# Patient Record
Sex: FEMALE | Race: OTHER | URBAN - METROPOLITAN AREA
[De-identification: names, ages, dates, MRNs, and addresses within clinical notes are randomized per-mention and may not be internally consistent; named-entity substitution may affect disease eponyms.]

---

## 2022-11-18 ENCOUNTER — OFFICE VISIT (OUTPATIENT)
Dept: OBGYN CLINIC | Facility: CLINIC | Age: 32
End: 2022-11-18
Payer: MEDICAID

## 2022-11-18 VITALS
SYSTOLIC BLOOD PRESSURE: 123 MMHG | DIASTOLIC BLOOD PRESSURE: 74 MMHG | HEART RATE: 73 BPM | WEIGHT: 199.81 LBS | HEIGHT: 67 IN | BODY MASS INDEX: 31.36 KG/M2

## 2022-11-18 DIAGNOSIS — N92.6 MISSED MENSES: Primary | ICD-10-CM

## 2022-11-18 PROBLEM — Z98.891 PREVIOUS CESAREAN SECTION: Status: ACTIVE | Noted: 2022-11-18

## 2022-11-18 PROBLEM — Z78.9 PREFERRED LANGUAGE IS URDU: Status: ACTIVE | Noted: 2022-11-18

## 2022-11-18 PROBLEM — O09.30 INSUFFICIENT PRENATAL CARE (HCC): Status: ACTIVE | Noted: 2022-11-18

## 2022-11-18 PROBLEM — O99.210 OBESITY IN PREGNANCY: Status: ACTIVE | Noted: 2022-11-18

## 2022-11-18 PROBLEM — O09.30 INSUFFICIENT PRENATAL CARE: Status: ACTIVE | Noted: 2022-11-18

## 2022-11-18 PROBLEM — O99.210 OBESITY IN PREGNANCY (HCC): Status: ACTIVE | Noted: 2022-11-18

## 2022-11-18 PROBLEM — IMO0001 PREFERRED LANGUAGE IS URDU: Status: ACTIVE | Noted: 2022-11-18

## 2022-11-18 LAB
CONTROL LINE PRESENT WITH A CLEAR BACKGROUND (YES/NO): YES YES/NO
PREGNANCY TEST, URINE: POSITIVE

## 2022-11-18 PROCEDURE — 3008F BODY MASS INDEX DOCD: CPT | Performed by: ADVANCED PRACTICE MIDWIFE

## 2022-11-18 PROCEDURE — 3078F DIAST BP <80 MM HG: CPT | Performed by: ADVANCED PRACTICE MIDWIFE

## 2022-11-18 PROCEDURE — 99203 OFFICE O/P NEW LOW 30 MIN: CPT | Performed by: ADVANCED PRACTICE MIDWIFE

## 2022-11-18 PROCEDURE — 3074F SYST BP LT 130 MM HG: CPT | Performed by: ADVANCED PRACTICE MIDWIFE

## 2022-11-18 PROCEDURE — 81025 URINE PREGNANCY TEST: CPT | Performed by: ADVANCED PRACTICE MIDWIFE

## 2022-11-18 RX ORDER — PNV NO.95/FERROUS FUM/FOLIC AC 28MG-0.8MG
TABLET ORAL
COMMUNITY
Start: 2020-05-22

## 2022-11-18 RX ORDER — FAMOTIDINE 20 MG/1
20 TABLET, FILM COATED ORAL 2 TIMES DAILY
Qty: 60 TABLET | Refills: 2 | Status: SHIPPED | OUTPATIENT
Start: 2022-11-18

## 2022-11-18 RX ORDER — FERROUS SULFATE 325(65) MG
TABLET ORAL
COMMUNITY

## 2022-11-23 ENCOUNTER — NURSE ONLY (OUTPATIENT)
Dept: OBGYN CLINIC | Facility: CLINIC | Age: 32
End: 2022-11-23
Payer: MEDICAID

## 2022-11-23 DIAGNOSIS — Z34.81 ENCOUNTER FOR SUPERVISION OF OTHER NORMAL PREGNANCY IN FIRST TRIMESTER: Primary | ICD-10-CM

## 2022-11-23 PROCEDURE — 99211 OFF/OP EST MAY X REQ PHY/QHP: CPT | Performed by: ADVANCED PRACTICE MIDWIFE

## 2022-11-23 NOTE — PROGRESS NOTES
Telephone call today for OB RN education visit, educational material reviewed.  line used [id number]. Pt verbalized understanding. Prepregnancy BMI 31. Orders placed for NOB labs including HCV, hemoglobin A1C, varicella and nutrition consult. Patients desires cell free DNA testing. Advised kit to be picked up from office. Pt had a Pap 2020. Pt was scheduled for NOB appt. Patient advised on OTC medications to treat constipation. Patient will complete EDPS and MICHELINE at next appointment. Pt desires natural birth with epidural.     Pt. Has answered NO 5P questions and has NO  risk factors. Pt. Given What Pregnant need to Know handout     Pt. Counseled on AGOG and ACNM guidelines recommending carrier testing. . Patient declined.

## 2022-12-16 ENCOUNTER — INITIAL PRENATAL (OUTPATIENT)
Dept: OBGYN CLINIC | Facility: CLINIC | Age: 32
End: 2022-12-16
Payer: MEDICAID

## 2022-12-16 ENCOUNTER — TELEPHONE (OUTPATIENT)
Dept: PERINATAL CARE | Facility: HOSPITAL | Age: 32
End: 2022-12-16

## 2022-12-16 ENCOUNTER — LAB ENCOUNTER (OUTPATIENT)
Dept: LAB | Facility: HOSPITAL | Age: 32
End: 2022-12-16
Attending: ADVANCED PRACTICE MIDWIFE
Payer: MEDICAID

## 2022-12-16 VITALS
DIASTOLIC BLOOD PRESSURE: 74 MMHG | WEIGHT: 205 LBS | BODY MASS INDEX: 32 KG/M2 | SYSTOLIC BLOOD PRESSURE: 111 MMHG | HEART RATE: 86 BPM

## 2022-12-16 DIAGNOSIS — O99.210 OBESITY IN PREGNANCY: ICD-10-CM

## 2022-12-16 DIAGNOSIS — Z34.81 ENCOUNTER FOR SUPERVISION OF OTHER NORMAL PREGNANCY IN FIRST TRIMESTER: ICD-10-CM

## 2022-12-16 DIAGNOSIS — Z34.82 ENCOUNTER FOR SUPERVISION OF OTHER NORMAL PREGNANCY IN SECOND TRIMESTER: Primary | ICD-10-CM

## 2022-12-16 DIAGNOSIS — Z23 NEED FOR VACCINATION: ICD-10-CM

## 2022-12-16 PROBLEM — Z34.90 PREGNANCY: Status: ACTIVE | Noted: 2022-11-18

## 2022-12-16 PROBLEM — Z34.90 PREGNANCY (HCC): Status: ACTIVE | Noted: 2022-11-18

## 2022-12-16 LAB
ANTIBODY SCREEN: NEGATIVE
BASOPHILS # BLD AUTO: 0.02 X10(3) UL (ref 0–0.2)
BASOPHILS NFR BLD AUTO: 0.2 %
DEPRECATED RDW RBC AUTO: 43.5 FL (ref 35.1–46.3)
EOSINOPHIL # BLD AUTO: 0.13 X10(3) UL (ref 0–0.7)
EOSINOPHIL NFR BLD AUTO: 1.2 %
ERYTHROCYTE [DISTWIDTH] IN BLOOD BY AUTOMATED COUNT: 15.2 % (ref 11–15)
EST. AVERAGE GLUCOSE BLD GHB EST-MCNC: 108 MG/DL (ref 68–126)
HBA1C MFR BLD: 5.4 % (ref ?–5.7)
HBV SURFACE AG SER-ACNC: <0.1 [IU]/L
HBV SURFACE AG SERPL QL IA: NONREACTIVE
HCT VFR BLD AUTO: 39.6 %
HCV AB SERPL QL IA: NONREACTIVE
HGB BLD-MCNC: 12.7 G/DL
IMM GRANULOCYTES # BLD AUTO: 0.07 X10(3) UL (ref 0–1)
IMM GRANULOCYTES NFR BLD: 0.6 %
LYMPHOCYTES # BLD AUTO: 1.68 X10(3) UL (ref 1–4)
LYMPHOCYTES NFR BLD AUTO: 15.1 %
MCH RBC QN AUTO: 25.4 PG (ref 26–34)
MCHC RBC AUTO-ENTMCNC: 32.1 G/DL (ref 31–37)
MCV RBC AUTO: 79.2 FL
MONOCYTES # BLD AUTO: 0.52 X10(3) UL (ref 0.1–1)
MONOCYTES NFR BLD AUTO: 4.7 %
NEUTROPHILS # BLD AUTO: 8.68 X10 (3) UL (ref 1.5–7.7)
NEUTROPHILS # BLD AUTO: 8.68 X10(3) UL (ref 1.5–7.7)
NEUTROPHILS NFR BLD AUTO: 78.2 %
PLATELET # BLD AUTO: 288 10(3)UL (ref 150–450)
RBC # BLD AUTO: 5 X10(6)UL
RH BLOOD TYPE: POSITIVE
RUBV IGG SER QL: POSITIVE
RUBV IGG SER-ACNC: >500 IU/ML (ref 10–?)
WBC # BLD AUTO: 11.1 X10(3) UL (ref 4–11)

## 2022-12-16 PROCEDURE — 86803 HEPATITIS C AB TEST: CPT

## 2022-12-16 PROCEDURE — 3074F SYST BP LT 130 MM HG: CPT | Performed by: ADVANCED PRACTICE MIDWIFE

## 2022-12-16 PROCEDURE — 86900 BLOOD TYPING SEROLOGIC ABO: CPT

## 2022-12-16 PROCEDURE — 87389 HIV-1 AG W/HIV-1&-2 AB AG IA: CPT

## 2022-12-16 PROCEDURE — 86780 TREPONEMA PALLIDUM: CPT

## 2022-12-16 PROCEDURE — 87086 URINE CULTURE/COLONY COUNT: CPT

## 2022-12-16 PROCEDURE — 3078F DIAST BP <80 MM HG: CPT | Performed by: ADVANCED PRACTICE MIDWIFE

## 2022-12-16 PROCEDURE — 86762 RUBELLA ANTIBODY: CPT

## 2022-12-16 PROCEDURE — 90471 IMMUNIZATION ADMIN: CPT | Performed by: ADVANCED PRACTICE MIDWIFE

## 2022-12-16 PROCEDURE — 86850 RBC ANTIBODY SCREEN: CPT

## 2022-12-16 PROCEDURE — 85025 COMPLETE CBC W/AUTO DIFF WBC: CPT

## 2022-12-16 PROCEDURE — 87340 HEPATITIS B SURFACE AG IA: CPT

## 2022-12-16 PROCEDURE — 0502F SUBSEQUENT PRENATAL CARE: CPT | Performed by: ADVANCED PRACTICE MIDWIFE

## 2022-12-16 PROCEDURE — 86901 BLOOD TYPING SEROLOGIC RH(D): CPT

## 2022-12-16 PROCEDURE — 36415 COLL VENOUS BLD VENIPUNCTURE: CPT

## 2022-12-16 PROCEDURE — 83036 HEMOGLOBIN GLYCOSYLATED A1C: CPT

## 2022-12-16 PROCEDURE — 90686 IIV4 VACC NO PRSV 0.5 ML IM: CPT | Performed by: ADVANCED PRACTICE MIDWIFE

## 2022-12-16 PROCEDURE — 86787 VARICELLA-ZOSTER ANTIBODY: CPT

## 2022-12-16 NOTE — TELEPHONE ENCOUNTER
Primary Diagnosis Code: O99.210 Description: Obesity complicating pregnancy, unspecified trimester  Secondary Diagnosis Code:  Description:   Date of Service: Not provided    CPT Code: 47313 Description: Capri Pate FETUS  Authorization Number: [id number]  Review Date: 12/16/2022 4:05:10 PM  Expiration Date: 9/12/2023  Status: Your case has been Approved.

## 2022-12-16 NOTE — PROGRESS NOTES
Some nausea, no vomiting. No pain or bleeding. Declines genetic screening. Will go do labs today. Here with . Hx of c/s for FTD, got to complete and pushed. Baby 10+ #. Discussed diet and decreasing carbs and sugars to try to help with decreasing risk of macrosomia. Desires . Will plan growth ultrasound near term for EFW.  Warning signs reviewed

## 2022-12-19 LAB
T PALLIDUM AB SER QL: NEGATIVE
VZV IGG SER IA-ACNC: 1270 (ref 165–?)

## 2023-01-13 ENCOUNTER — ROUTINE PRENATAL (OUTPATIENT)
Dept: OBGYN CLINIC | Facility: CLINIC | Age: 33
End: 2023-01-13

## 2023-01-13 DIAGNOSIS — Z34.83 PRENATAL CARE, SUBSEQUENT PREGNANCY IN THIRD TRIMESTER: Primary | ICD-10-CM

## 2023-01-13 PROCEDURE — 0502F SUBSEQUENT PRENATAL CARE: CPT | Performed by: ADVANCED PRACTICE MIDWIFE

## 2023-01-20 ENCOUNTER — ROUTINE PRENATAL (OUTPATIENT)
Dept: OBGYN CLINIC | Facility: CLINIC | Age: 33
End: 2023-01-20

## 2023-01-20 VITALS
BODY MASS INDEX: 34 KG/M2 | DIASTOLIC BLOOD PRESSURE: 73 MMHG | SYSTOLIC BLOOD PRESSURE: 105 MMHG | WEIGHT: 215 LBS | HEART RATE: 87 BPM

## 2023-01-20 DIAGNOSIS — N76.0 VAGINITIS AND VULVOVAGINITIS: ICD-10-CM

## 2023-01-20 DIAGNOSIS — Z34.82 PRENATAL CARE, SUBSEQUENT PREGNANCY IN SECOND TRIMESTER: Primary | ICD-10-CM

## 2023-01-20 PROCEDURE — 3074F SYST BP LT 130 MM HG: CPT | Performed by: ADVANCED PRACTICE MIDWIFE

## 2023-01-20 PROCEDURE — 0502F SUBSEQUENT PRENATAL CARE: CPT | Performed by: ADVANCED PRACTICE MIDWIFE

## 2023-01-20 PROCEDURE — 3078F DIAST BP <80 MM HG: CPT | Performed by: ADVANCED PRACTICE MIDWIFE

## 2023-01-20 NOTE — PROGRESS NOTES
Radha Head, is at 18w4d, here for her JAZZMINE visit. She declines an Romansh  today and elects her  to translate for her. Currently, she is feeling well. Denies 2nd trimester danger signs. She complains of vaginal discharge with odor, starting 1-2 weeks ago. Denies itching, lesions, or pelvic pain. Vital signs and weight reviewed  See flowsheets Prenatal Physical  Flu shot & COVID shot status: completed, completed     Patient Active Problem List:     Previous  section     Obesity in pregnancy     Preferred language is Romansh     Pregnancy     Physical Exam  Cardiovascular:      Rate and Rhythm: Normal rate and regular rhythm. Pulmonary:      Effort: Pulmonary effort is normal.   Genitourinary:     General: Normal vulva. Exam position: Lithotomy position. Vagina: Vaginal discharge present. Cervix: Normal.      Comments: Curdy white discharge present in the vault. Musculoskeletal:         General: Normal range of motion. Cervical back: Normal range of motion. Skin:     General: Skin is warm. Neurological:      General: No focal deficit present. Mental Status: She is alert. Psychiatric:         Mood and Affect: Mood normal.       Assessment/Plan: Vaginitis swab collected and will treat based on results. Pt to complete anatomy US. She already has an appt. Will monitor for receipt of C/S OP report  Next visit: 4 weeks     Reviewed:   Prenatal visit schedule  COVID-19 precautions and CDC guidelines for pregnant women in pregnancy. See AVS  CNM care  2nd trimester precautions and expectations   labor precautions  Danger signs  Labor precautions    Pt verbalized understanding. All questions answered. No barriers to learning identified    WINDY Barreto/QUEENIE under direct supervision of Jed Rogers CNM    Patient seen in conjunction with WINDY. I was present for exam and evaluation and agree with assessment and plan.

## 2023-01-22 LAB
GENITAL VAGINOSIS SCREEN: POSITIVE
TRICHOMONAS SCREEN: NEGATIVE

## 2023-02-02 ENCOUNTER — TELEPHONE (OUTPATIENT)
Dept: OBGYN CLINIC | Facility: CLINIC | Age: 33
End: 2023-02-02

## 2023-02-02 RX ORDER — METRONIDAZOLE 500 MG/1
500 TABLET ORAL 2 TIMES DAILY
Qty: 14 TABLET | Refills: 0 | Status: SHIPPED | OUTPATIENT
Start: 2023-02-02 | End: 2023-02-09

## 2023-02-02 NOTE — TELEPHONE ENCOUNTER
----- Message from Terrence Germain CNM sent at 2/2/2023  2:35 PM CST -----  Please notify patient by phone with [employer]  of BV and yeast infections. Send scripts for Flagyl 500mg PO BID x7 days and Clotrimazole 2% vaginal cream x3 days.

## 2023-02-03 ENCOUNTER — HOSPITAL ENCOUNTER (OUTPATIENT)
Dept: PERINATAL CARE | Facility: HOSPITAL | Age: 33
Discharge: HOME | End: 2023-02-03
Attending: OBSTETRICS & GYNECOLOGY
Payer: MEDICAID

## 2023-02-03 ENCOUNTER — HOSPITAL ENCOUNTER (OUTPATIENT)
Dept: PERINATAL CARE | Facility: HOSPITAL | Age: 33
Discharge: HOME | End: 2023-02-03
Attending: ADVANCED PRACTICE MIDWIFE
Payer: MEDICAID

## 2023-02-03 VITALS
BODY MASS INDEX: 34 KG/M2 | DIASTOLIC BLOOD PRESSURE: 71 MMHG | HEART RATE: 97 BPM | SYSTOLIC BLOOD PRESSURE: 120 MMHG | WEIGHT: 215 LBS

## 2023-02-03 DIAGNOSIS — O99.210 OBESITY IN PREGNANCY: ICD-10-CM

## 2023-02-03 DIAGNOSIS — O99.212 OBESITY AFFECTING PREGNANCY IN SECOND TRIMESTER: Primary | ICD-10-CM

## 2023-02-03 DIAGNOSIS — O99.212 OBESITY AFFECTING PREGNANCY IN SECOND TRIMESTER: ICD-10-CM

## 2023-02-03 DIAGNOSIS — E66.9 CLASS 1 OBESITY WITHOUT SERIOUS COMORBIDITY WITH BODY MASS INDEX (BMI) OF 31.0 TO 31.9 IN ADULT, UNSPECIFIED OBESITY TYPE: ICD-10-CM

## 2023-02-03 PROCEDURE — 76811 OB US DETAILED SNGL FETUS: CPT | Performed by: OBSTETRICS & GYNECOLOGY

## 2023-02-06 NOTE — TELEPHONE ENCOUNTER
Talked to pt's , pt was on the phone call too, and informed both parties of test results and medication. Per , pt has already picked up her medication and has begun using. Pt and  have no questions.

## 2023-02-20 ENCOUNTER — ROUTINE PRENATAL (OUTPATIENT)
Dept: OBGYN CLINIC | Facility: CLINIC | Age: 33
End: 2023-02-20

## 2023-02-20 VITALS
WEIGHT: 216 LBS | HEART RATE: 86 BPM | SYSTOLIC BLOOD PRESSURE: 115 MMHG | BODY MASS INDEX: 34 KG/M2 | DIASTOLIC BLOOD PRESSURE: 76 MMHG

## 2023-02-20 DIAGNOSIS — N89.8 VAGINAL DISCHARGE: ICD-10-CM

## 2023-02-20 DIAGNOSIS — Z34.82 ENCOUNTER FOR SUPERVISION OF OTHER NORMAL PREGNANCY IN SECOND TRIMESTER: Primary | ICD-10-CM

## 2023-02-20 PROCEDURE — 0502F SUBSEQUENT PRENATAL CARE: CPT | Performed by: ADVANCED PRACTICE MIDWIFE

## 2023-02-20 PROCEDURE — 3074F SYST BP LT 130 MM HG: CPT | Performed by: ADVANCED PRACTICE MIDWIFE

## 2023-02-20 PROCEDURE — 3078F DIAST BP <80 MM HG: CPT | Performed by: ADVANCED PRACTICE MIDWIFE

## 2023-02-21 NOTE — PROGRESS NOTES
Marivel Jung reports she is feeling well. Endorses fetal movement. Denies contraction or vaginal bleeding. Does state she has some leaking. Unsure if discharge. She was treated for yeast and BV on 1/20. Speculum exam: Thick/chunky discharge, nitrazine negative, no pooling. Vaginosis swab collected and will treat based on results. Reviewed warning signs and when to call.  JAZZMINE 4wks

## 2023-02-22 ENCOUNTER — TELEPHONE (OUTPATIENT)
Dept: OBGYN CLINIC | Facility: CLINIC | Age: 33
End: 2023-02-22

## 2023-02-22 LAB
GENITAL VAGINOSIS SCREEN: NEGATIVE
TRICHOMONAS SCREEN: NEGATIVE

## 2023-02-22 NOTE — TELEPHONE ENCOUNTER
Notified pt of results & instructions per Jose Francisco Hope. Pharmacy confirmed.  got on phone & asked if the meat from [employer] that they had delivered, is halal. Advised  I am unfamiliar with that program & if unsure, give to someone who it isnt an issue for.   verbalized an understanding & agrees w/ plan

## 2023-02-22 NOTE — TELEPHONE ENCOUNTER
----- Message from Marcellus Mathews CNM sent at 2/22/2023  3:32 PM CST -----  Please call patient and let her know she has a yeast infection again. I have sent terconazole to her pharmacy. Thanks!

## 2023-02-23 ENCOUNTER — TELEPHONE (OUTPATIENT)
Dept: OBGYN CLINIC | Facility: CLINIC | Age: 33
End: 2023-02-23

## 2023-03-24 ENCOUNTER — ROUTINE PRENATAL (OUTPATIENT)
Dept: OBGYN CLINIC | Facility: CLINIC | Age: 33
End: 2023-03-24

## 2023-03-24 VITALS
HEART RATE: 81 BPM | WEIGHT: 224 LBS | BODY MASS INDEX: 35 KG/M2 | SYSTOLIC BLOOD PRESSURE: 107 MMHG | DIASTOLIC BLOOD PRESSURE: 74 MMHG

## 2023-03-24 DIAGNOSIS — M54.9 BACK PAIN IN PREGNANCY: ICD-10-CM

## 2023-03-24 DIAGNOSIS — Z34.82 ENCOUNTER FOR SUPERVISION OF OTHER NORMAL PREGNANCY IN SECOND TRIMESTER: Primary | ICD-10-CM

## 2023-03-24 DIAGNOSIS — O09.299 HISTORY OF MACROSOMIA IN INFANT IN PRIOR PREGNANCY, CURRENTLY PREGNANT: ICD-10-CM

## 2023-03-24 DIAGNOSIS — O99.891 BACK PAIN IN PREGNANCY: ICD-10-CM

## 2023-03-24 DIAGNOSIS — O99.210 OBESITY IN PREGNANCY, ANTEPARTUM: ICD-10-CM

## 2023-03-24 PROCEDURE — 3078F DIAST BP <80 MM HG: CPT | Performed by: ADVANCED PRACTICE MIDWIFE

## 2023-03-24 PROCEDURE — 3074F SYST BP LT 130 MM HG: CPT | Performed by: ADVANCED PRACTICE MIDWIFE

## 2023-03-24 PROCEDURE — 0502F SUBSEQUENT PRENATAL CARE: CPT | Performed by: ADVANCED PRACTICE MIDWIFE

## 2023-03-24 NOTE — PROGRESS NOTES
here with pt and helping with interpretation though she understands some English as well. Declines use of . Reports Back pain- PT referral placed  Still have not received Op report. Reviewed care everywhere and do not see it.  reports they received a letter that it was requested. JEET for op report signed again today. Given contact info for MD's for consult visit. To schedule several weeks out as want Op report prior to consult. Reports got message that growth ultrasound denied. Will place referral again. Looks like it may have been for detailed anatomy instead of growth so may be why denied. Baby active. Denies contractions, LOF or bleeding. Kick counts and signs of PTL reviewed. To do 3rd tri labs.

## 2023-03-26 PROBLEM — O09.299 HISTORY OF MACROSOMIA IN INFANT IN PRIOR PREGNANCY, CURRENTLY PREGNANT (HCC): Status: ACTIVE | Noted: 2023-03-26

## 2023-03-26 PROBLEM — O09.299 HISTORY OF MACROSOMIA IN INFANT IN PRIOR PREGNANCY, CURRENTLY PREGNANT: Status: ACTIVE | Noted: 2023-03-26

## 2023-03-29 ENCOUNTER — TELEPHONE (OUTPATIENT)
Dept: OBGYN CLINIC | Facility: CLINIC | Age: 33
End: 2023-03-29

## 2023-03-30 ENCOUNTER — TELEPHONE (OUTPATIENT)
Dept: PERINATAL CARE | Facility: HOSPITAL | Age: 33
End: 2023-03-30

## 2023-04-04 ENCOUNTER — TELEPHONE (OUTPATIENT)
Dept: OBGYN CLINIC | Facility: CLINIC | Age: 33
End: 2023-04-04

## 2023-04-04 NOTE — TELEPHONE ENCOUNTER
Records received.  [facility]

## 2023-04-07 ENCOUNTER — LAB ENCOUNTER (OUTPATIENT)
Dept: LAB | Facility: HOSPITAL | Age: 33
End: 2023-04-07
Attending: ADVANCED PRACTICE MIDWIFE
Payer: MEDICAID

## 2023-04-07 ENCOUNTER — ROUTINE PRENATAL (OUTPATIENT)
Dept: OBGYN CLINIC | Facility: CLINIC | Age: 33
End: 2023-04-07

## 2023-04-07 VITALS
BODY MASS INDEX: 35 KG/M2 | WEIGHT: 224 LBS | SYSTOLIC BLOOD PRESSURE: 102 MMHG | HEART RATE: 92 BPM | DIASTOLIC BLOOD PRESSURE: 69 MMHG

## 2023-04-07 DIAGNOSIS — Z11.3 SCREENING FOR STDS (SEXUALLY TRANSMITTED DISEASES): ICD-10-CM

## 2023-04-07 DIAGNOSIS — Z34.83 PRENATAL CARE, SUBSEQUENT PREGNANCY IN THIRD TRIMESTER: Primary | ICD-10-CM

## 2023-04-07 DIAGNOSIS — N76.0 VAGINITIS AND VULVOVAGINITIS: ICD-10-CM

## 2023-04-07 DIAGNOSIS — Z34.82 ENCOUNTER FOR SUPERVISION OF OTHER NORMAL PREGNANCY IN SECOND TRIMESTER: ICD-10-CM

## 2023-04-07 LAB
DEPRECATED RDW RBC AUTO: 38.5 FL (ref 35.1–46.3)
ERYTHROCYTE [DISTWIDTH] IN BLOOD BY AUTOMATED COUNT: 13.5 % (ref 11–15)
GLUCOSE 1H P GLC SERPL-MCNC: 144 MG/DL
HCT VFR BLD AUTO: 39.1 %
HGB BLD-MCNC: 12.7 G/DL
MCH RBC QN AUTO: 25.9 PG (ref 26–34)
MCHC RBC AUTO-ENTMCNC: 32.5 G/DL (ref 31–37)
MCV RBC AUTO: 79.8 FL
PLATELET # BLD AUTO: 256 10(3)UL (ref 150–450)
RBC # BLD AUTO: 4.9 X10(6)UL
WBC # BLD AUTO: 11.1 X10(3) UL (ref 4–11)

## 2023-04-07 PROCEDURE — 90471 IMMUNIZATION ADMIN: CPT | Performed by: ADVANCED PRACTICE MIDWIFE

## 2023-04-07 PROCEDURE — 3078F DIAST BP <80 MM HG: CPT | Performed by: ADVANCED PRACTICE MIDWIFE

## 2023-04-07 PROCEDURE — 36415 COLL VENOUS BLD VENIPUNCTURE: CPT

## 2023-04-07 PROCEDURE — 82950 GLUCOSE TEST: CPT

## 2023-04-07 PROCEDURE — 86780 TREPONEMA PALLIDUM: CPT

## 2023-04-07 PROCEDURE — 87389 HIV-1 AG W/HIV-1&-2 AB AG IA: CPT

## 2023-04-07 PROCEDURE — 3074F SYST BP LT 130 MM HG: CPT | Performed by: ADVANCED PRACTICE MIDWIFE

## 2023-04-07 PROCEDURE — 0502F SUBSEQUENT PRENATAL CARE: CPT | Performed by: ADVANCED PRACTICE MIDWIFE

## 2023-04-07 PROCEDURE — 85027 COMPLETE CBC AUTOMATED: CPT

## 2023-04-07 PROCEDURE — 90715 TDAP VACCINE 7 YRS/> IM: CPT | Performed by: ADVANCED PRACTICE MIDWIFE

## 2023-04-07 NOTE — PROGRESS NOTES
Gideon Allan, is at 29w4d, here for her JAZZMINE visit. Currently, she is feeling well. Denies 3rd trimester danger signs. Will complete 3T labs today following this visit. Accepts Tdap. Notes greenish vaginal discharge x5-6 days, no itching, just odor    Vital signs and weight reviewed  See flowsheets   Thick yellow clumpy discharge in vagina    Assessment/Plan: Tdap given, 3T labs to follow this visit  H/O C/S: pt to schedule MD consult, contact info provided  Vaginitis: vaginal culture and GC/CT sent. eRx clotrimazole sent  Next visit: 2 weeks    Reviewed:   Prenatal visit schedule  Recommendations and rationale for Tdap vaccine(s) in pregnancy   labor precautions  Kick counts  Danger signs    Pt verbalized understanding. All questions answered.  No barriers to learning identified

## 2023-04-09 LAB
GENITAL VAGINOSIS SCREEN: NEGATIVE
TRICHOMONAS SCREEN: NEGATIVE

## 2023-04-10 LAB
C TRACH DNA SPEC QL NAA+PROBE: NEGATIVE
N GONORRHOEA DNA SPEC QL NAA+PROBE: NEGATIVE
T PALLIDUM AB SER QL: NEGATIVE

## 2023-04-11 ENCOUNTER — TELEPHONE (OUTPATIENT)
Dept: OBGYN CLINIC | Facility: CLINIC | Age: 33
End: 2023-04-11

## 2023-04-11 DIAGNOSIS — O99.810 ABNORMAL MATERNAL GLUCOSE TOLERANCE, ANTEPARTUM: Primary | ICD-10-CM

## 2023-04-11 NOTE — TELEPHONE ENCOUNTER
----- Message from Jena Mast CNM sent at 4/10/2023 11:23 AM CDT -----  Please call patient. 1hr GTT was elevated. She will need a 3 hour. Remaining labs normal. Thanks!

## 2023-04-13 NOTE — TELEPHONE ENCOUNTER
Notified pt &  of results & instructions per Neil Ivory. Testing instructions for 3hr gtt & phone # to schedule given. Pt declined . Phone call transferred to CS.  Pt verbalized an understanding & agrees w/ plan

## 2023-04-24 ENCOUNTER — ROUTINE PRENATAL (OUTPATIENT)
Dept: OBGYN CLINIC | Facility: CLINIC | Age: 33
End: 2023-04-24

## 2023-04-24 VITALS
DIASTOLIC BLOOD PRESSURE: 76 MMHG | WEIGHT: 228 LBS | SYSTOLIC BLOOD PRESSURE: 110 MMHG | HEART RATE: 89 BPM | BODY MASS INDEX: 36 KG/M2

## 2023-04-24 DIAGNOSIS — N76.0 VAGINITIS AND VULVOVAGINITIS: ICD-10-CM

## 2023-04-24 DIAGNOSIS — O99.810 ABNORMAL MATERNAL GLUCOSE TOLERANCE, ANTEPARTUM: Primary | ICD-10-CM

## 2023-04-24 LAB — TRICHOMONAS SCREEN: NEGATIVE

## 2023-04-24 PROCEDURE — 87808 TRICHOMONAS ASSAY W/OPTIC: CPT | Performed by: ADVANCED PRACTICE MIDWIFE

## 2023-04-24 PROCEDURE — 87106 FUNGI IDENTIFICATION YEAST: CPT | Performed by: ADVANCED PRACTICE MIDWIFE

## 2023-04-24 PROCEDURE — 87205 SMEAR GRAM STAIN: CPT | Performed by: ADVANCED PRACTICE MIDWIFE

## 2023-04-24 NOTE — PROGRESS NOTES
Active baby. Has 3 hour scheduled. Encouraged ADA diet. Reviewed danger signs. Has growth scheduled. Follow-up in 2 weeks.

## 2023-04-26 LAB
GENITAL VAGINOSIS SCREEN: NEGATIVE
TRICHOMONAS SCREEN: NEGATIVE

## 2023-04-28 ENCOUNTER — HOSPITAL ENCOUNTER (OUTPATIENT)
Dept: PERINATAL CARE | Facility: HOSPITAL | Age: 33
Discharge: HOME | End: 2023-04-28
Attending: OBSTETRICS & GYNECOLOGY
Payer: MEDICAID

## 2023-04-28 VITALS
BODY MASS INDEX: 36 KG/M2 | WEIGHT: 228 LBS | DIASTOLIC BLOOD PRESSURE: 72 MMHG | SYSTOLIC BLOOD PRESSURE: 108 MMHG | HEART RATE: 85 BPM

## 2023-04-28 DIAGNOSIS — O09.299 HISTORY OF MACROSOMIA IN INFANT IN PRIOR PREGNANCY, CURRENTLY PREGNANT: ICD-10-CM

## 2023-04-28 DIAGNOSIS — Z98.891 PREVIOUS CESAREAN SECTION: ICD-10-CM

## 2023-04-28 DIAGNOSIS — O99.213 OBESITY AFFECTING PREGNANCY IN THIRD TRIMESTER: ICD-10-CM

## 2023-04-28 DIAGNOSIS — O99.213 OBESITY AFFECTING PREGNANCY IN THIRD TRIMESTER: Primary | ICD-10-CM

## 2023-04-28 PROCEDURE — 76816 OB US FOLLOW-UP PER FETUS: CPT | Performed by: OBSTETRICS & GYNECOLOGY

## 2023-04-29 ENCOUNTER — LABORATORY ENCOUNTER (OUTPATIENT)
Dept: LAB | Facility: HOSPITAL | Age: 33
End: 2023-04-29
Attending: ADVANCED PRACTICE MIDWIFE
Payer: MEDICAID

## 2023-04-29 DIAGNOSIS — O99.810 ABNORMAL MATERNAL GLUCOSE TOLERANCE, ANTEPARTUM: ICD-10-CM

## 2023-04-29 LAB
EST. AVERAGE GLUCOSE BLD GHB EST-MCNC: 111 MG/DL (ref 68–126)
GLUCOSE 1H P GLC SERPL-MCNC: 172 MG/DL
GLUCOSE 2H P GLC SERPL-MCNC: 100 MG/DL
GLUCOSE 3H P GLC SERPL-MCNC: 77 MG/DL (ref 70–140)
GLUCOSE P FAST SERPL-MCNC: 100 MG/DL
HBA1C MFR BLD: 5.5 % (ref ?–5.7)

## 2023-04-29 PROCEDURE — 82951 GLUCOSE TOLERANCE TEST (GTT): CPT

## 2023-04-29 PROCEDURE — 36415 COLL VENOUS BLD VENIPUNCTURE: CPT

## 2023-04-29 PROCEDURE — 83036 HEMOGLOBIN GLYCOSYLATED A1C: CPT

## 2023-04-29 PROCEDURE — 82952 GTT-ADDED SAMPLES: CPT

## 2023-05-12 ENCOUNTER — ROUTINE PRENATAL (OUTPATIENT)
Dept: OBGYN CLINIC | Facility: CLINIC | Age: 33
End: 2023-05-12

## 2023-05-12 VITALS
WEIGHT: 230 LBS | SYSTOLIC BLOOD PRESSURE: 107 MMHG | HEART RATE: 93 BPM | DIASTOLIC BLOOD PRESSURE: 72 MMHG | BODY MASS INDEX: 36 KG/M2

## 2023-05-12 DIAGNOSIS — Z34.83 PRENATAL CARE, SUBSEQUENT PREGNANCY IN THIRD TRIMESTER: Primary | ICD-10-CM

## 2023-05-12 DIAGNOSIS — Z98.891 PREVIOUS CESAREAN SECTION: ICD-10-CM

## 2023-05-12 PROCEDURE — 3078F DIAST BP <80 MM HG: CPT | Performed by: ADVANCED PRACTICE MIDWIFE

## 2023-05-12 PROCEDURE — 0502F SUBSEQUENT PRENATAL CARE: CPT | Performed by: ADVANCED PRACTICE MIDWIFE

## 2023-05-12 PROCEDURE — 3074F SYST BP LT 130 MM HG: CPT | Performed by: ADVANCED PRACTICE MIDWIFE

## 2023-05-12 NOTE — PROGRESS NOTES
Radha Head, is at 34w4d, here for her JAZZMINE visit. Currently, she is feeling well. Denies 3rd trimester danger signs. Reports vaginal itching has rsolved. Vital signs and weight reviewed  See flowsheets     Assessment/Plan:   TOLAC: OB consult to discuss risks/benfits of     Next visit: 2 weeks. GBS then    Reviewed:   Prenatal visit schedule    CNM care  Emergency contact info and safe use of messaging in MyChart  3rd trimester precautions and expectations   labor precautions  Kick counts  Danger signs  Labor precautions  Current L&D policies: Three visitors plus   Nitrous available  No water birth available at this time    Pt verbalized understanding. All questions answered.  No barriers to learning identified

## 2023-05-15 ENCOUNTER — TELEPHONE (OUTPATIENT)
Dept: OBGYN CLINIC | Facility: CLINIC | Age: 33
End: 2023-05-15

## 2023-05-26 ENCOUNTER — HOSPITAL ENCOUNTER (OUTPATIENT)
Facility: HOSPITAL | Age: 33
Discharge: HOME | End: 2023-05-26
Attending: ADVANCED PRACTICE MIDWIFE | Admitting: OBSTETRICS & GYNECOLOGY
Payer: MEDICAID

## 2023-05-26 ENCOUNTER — HOSPITAL ENCOUNTER (OUTPATIENT)
Dept: ULTRASOUND IMAGING | Age: 33
Discharge: HOME | End: 2023-05-26
Attending: ADVANCED PRACTICE MIDWIFE
Payer: MEDICAID

## 2023-05-26 ENCOUNTER — ROUTINE PRENATAL (OUTPATIENT)
Dept: OBGYN CLINIC | Facility: CLINIC | Age: 33
End: 2023-05-26

## 2023-05-26 VITALS
SYSTOLIC BLOOD PRESSURE: 115 MMHG | BODY MASS INDEX: 37 KG/M2 | WEIGHT: 234 LBS | DIASTOLIC BLOOD PRESSURE: 69 MMHG | HEART RATE: 79 BPM

## 2023-05-26 VITALS — HEART RATE: 76 BPM | SYSTOLIC BLOOD PRESSURE: 112 MMHG | DIASTOLIC BLOOD PRESSURE: 77 MMHG

## 2023-05-26 DIAGNOSIS — O99.213 OBESITY AFFECTING PREGNANCY IN THIRD TRIMESTER: ICD-10-CM

## 2023-05-26 DIAGNOSIS — O32.0XX0 UNCERTAIN LIE OF FETUS, SINGLE OR UNSPECIFIED FETUS: ICD-10-CM

## 2023-05-26 DIAGNOSIS — Z34.83 PRENATAL CARE, SUBSEQUENT PREGNANCY IN THIRD TRIMESTER: Primary | ICD-10-CM

## 2023-05-26 PROCEDURE — 76815 OB US LIMITED FETUS(S): CPT | Performed by: ADVANCED PRACTICE MIDWIFE

## 2023-05-26 PROCEDURE — 3078F DIAST BP <80 MM HG: CPT | Performed by: ADVANCED PRACTICE MIDWIFE

## 2023-05-26 PROCEDURE — 0502F SUBSEQUENT PRENATAL CARE: CPT | Performed by: ADVANCED PRACTICE MIDWIFE

## 2023-05-26 PROCEDURE — 3074F SYST BP LT 130 MM HG: CPT | Performed by: ADVANCED PRACTICE MIDWIFE

## 2023-05-26 PROCEDURE — 59025 FETAL NON-STRESS TEST: CPT | Performed by: ADVANCED PRACTICE MIDWIFE

## 2023-05-26 NOTE — TRIAGE
[facility]      Triage Note    Veldon Libman Patient Status:  Outpatient    12/3/1990 MRN [id number]   Location [address] Attending Tani Jung, [address] Day # 0 PCP No primary care provider on file.  Para: [phone]  Estimated Date of Delivery: 23  Gestation: 36w4d    Chief Complaint    Non-stress Test         Allergies:    Ibuprofen               NAUSEA AND VOMITING    No orders of the defined types were placed in this encounter. Lab Results   Component Value Date    WBC 11.1 (H) 2023    HGB 12.7 2023    HCT 39.1 2023    .0 2023       Clinitek UA  Lab Results   Component Value Date    URINECUL No Growth at 18-24 hrs. 2022       UA  No results found for: Laurelyn Ala, SPECGRAVITY, PROUR, GLUUR, KETUR, BILUR, BLOODURINE, NITRITE, UROBILINOGEN, LEUUR, UASA     23  1531 23  1557   BP: 112/72 112/77   Pulse: 79 76       NST  Variability: Moderate           Accelerations: Yes           Decelerations: None            Baseline: 125 BPM           Uterine Irritability: No           Contractions: Not present                                        Acoustic Stimulator: No           Nonstress Test Interpretation: Reactive           Nonstress Test Second Interpretation: Reactive          FHR Category: Category I             Additional Comments Comments: Pt scheduled for weekly NSTs. Next NST on . Fetus vertex by ultrasound done at bedside. Patient presents with:  Non-stress Test: NST with Ultrasound for presentation.         Amos Crain RN  2023 4:34 PM

## 2023-05-26 NOTE — PROGRESS NOTES
Corine Fallon, is at 36w4d, here for her JAZZMINE visit. Currently, she is feeling well. Denies 3rd trimester danger signs. Vital signs and weight reviewed  See flowsheets    Assessment/Plan: GBS sent today  Obesity: Weekly NSTs. Sent to Triage today and pt to schedule future NSTs  Uncertain lie: Ultrasound for presentation today  Prior C/S: Has OB consult with Júnior on 6/9  Next visit: 1 week    Reviewed:   Prenatal visit schedule  Kick counts  Danger signs  Labor precautions    Pt verbalized understanding. All questions answered.  No barriers to learning identified

## 2023-05-26 NOTE — PROGRESS NOTES
Pt is a 28year old female admitted to TR5/TR5-A. Patient presents with:  Non-stress Test: NST with Ultrasound for presentation. Pt is  36w4d intra-uterine pregnancy. History obtained, consents signed. Oriented to room, staff, and plan of care.

## 2023-05-26 NOTE — PROGRESS NOTES
Patient sat up to leave and felt dizzy. Patient encouraged to lay down and given juice, she states that she has not eaten today. Patient encouraged to lay for a bit but patient states that she has to leave because her  is waiting for her. Walked patient out to  and informed  that she was dizzy, encouraged her to rest. Patient states that she is ok. Patient discharged home, has scheduled weekly NSTs.

## 2023-05-28 LAB — GROUP B STREP BY PCR FOR PCR OVT: NEGATIVE

## 2023-06-02 ENCOUNTER — ROUTINE PRENATAL (OUTPATIENT)
Dept: OBGYN CLINIC | Facility: CLINIC | Age: 33
End: 2023-06-02

## 2023-06-02 ENCOUNTER — HOSPITAL ENCOUNTER (OUTPATIENT)
Dept: PERINATAL CARE | Facility: HOSPITAL | Age: 33
Discharge: HOME | End: 2023-06-02
Attending: ADVANCED PRACTICE MIDWIFE
Payer: MEDICAID

## 2023-06-02 VITALS
WEIGHT: 237 LBS | SYSTOLIC BLOOD PRESSURE: 117 MMHG | BODY MASS INDEX: 37 KG/M2 | HEART RATE: 74 BPM | DIASTOLIC BLOOD PRESSURE: 78 MMHG

## 2023-06-02 VITALS — HEART RATE: 80 BPM | DIASTOLIC BLOOD PRESSURE: 67 MMHG | SYSTOLIC BLOOD PRESSURE: 122 MMHG

## 2023-06-02 DIAGNOSIS — Z34.83 PRENATAL CARE, SUBSEQUENT PREGNANCY IN THIRD TRIMESTER: Primary | ICD-10-CM

## 2023-06-02 DIAGNOSIS — O99.210 OBESITY IN PREGNANCY: Primary | ICD-10-CM

## 2023-06-02 PROBLEM — O32.0XX0: Status: RESOLVED | Noted: 2023-05-26 | Resolved: 2023-06-02

## 2023-06-02 PROCEDURE — 0502F SUBSEQUENT PRENATAL CARE: CPT | Performed by: ADVANCED PRACTICE MIDWIFE

## 2023-06-02 PROCEDURE — 3074F SYST BP LT 130 MM HG: CPT | Performed by: ADVANCED PRACTICE MIDWIFE

## 2023-06-02 PROCEDURE — 59025 FETAL NON-STRESS TEST: CPT

## 2023-06-02 PROCEDURE — 3078F DIAST BP <80 MM HG: CPT | Performed by: ADVANCED PRACTICE MIDWIFE

## 2023-06-02 RX ORDER — BREAST PUMP
EACH MISCELLANEOUS
Qty: 1 EACH | Refills: 0 | Status: SHIPPED | OUTPATIENT
Start: 2023-06-02

## 2023-06-02 NOTE — PROGRESS NOTES
Farhad Cancino, is at 37w4d, here for her JAZZMINE visit. Currently, she is feeling tired and with swollen feet. Birth plan reviewed:    Prior birth experiences/outcomes: C/S for failure to progress and maternal exhaustion  Support:   Pain management: epidural  Vitamin K: yes  Erythromycin ointment: yes  Placenta: discard  Circumcision: desires  Peds: in-house  Feeding method: breast/pump ordered 6/2  Contraception: planning more babies soon then Paragard when done    Vital signs and weight reviewed  See flowsheets   GBS negative and reviewed with patient    5/26 ultrasound for presentation - cephalic  Cephalic today on VERY limited ultrasound by Wilfrido Kohli just to confirm presentation because difficult to feel by Leopolds    Assessment/Plan: 6/9 OB consult  Next visit: 1 week    Reviewed:   Prenatal visit schedule  TOLAC in labor requesting elective C/S --> to C/S  Kick counts  Danger signs  Labor precautions    Pt verbalized understanding. All questions answered.  No barriers to learning identified

## 2023-06-09 ENCOUNTER — APPOINTMENT (OUTPATIENT)
Dept: OBGYN CLINIC | Facility: HOSPITAL | Age: 33
End: 2023-06-09
Attending: ADVANCED PRACTICE MIDWIFE
Payer: MEDICAID

## 2023-06-09 ENCOUNTER — NST DOCUMENTATION (OUTPATIENT)
Dept: OBGYN CLINIC | Facility: CLINIC | Age: 33
End: 2023-06-09

## 2023-06-09 ENCOUNTER — HOSPITAL ENCOUNTER (OUTPATIENT)
Dept: ULTRASOUND IMAGING | Facility: HOSPITAL | Age: 33
Discharge: HOME | End: 2023-06-09
Attending: ADVANCED PRACTICE MIDWIFE
Payer: MEDICAID

## 2023-06-09 ENCOUNTER — ROUTINE PRENATAL (OUTPATIENT)
Dept: OBGYN CLINIC | Facility: CLINIC | Age: 33
End: 2023-06-09

## 2023-06-09 ENCOUNTER — HOSPITAL ENCOUNTER (OUTPATIENT)
Facility: HOSPITAL | Age: 33
Discharge: HOME | End: 2023-06-09
Attending: ADVANCED PRACTICE MIDWIFE | Admitting: OBSTETRICS & GYNECOLOGY
Payer: MEDICAID

## 2023-06-09 ENCOUNTER — INITIAL PRENATAL (OUTPATIENT)
Dept: OBGYN CLINIC | Facility: CLINIC | Age: 33
End: 2023-06-09
Payer: MEDICAID

## 2023-06-09 VITALS
SYSTOLIC BLOOD PRESSURE: 125 MMHG | DIASTOLIC BLOOD PRESSURE: 81 MMHG | HEART RATE: 80 BPM | BODY MASS INDEX: 37 KG/M2 | WEIGHT: 239 LBS

## 2023-06-09 VITALS — HEART RATE: 84 BPM | SYSTOLIC BLOOD PRESSURE: 129 MMHG | DIASTOLIC BLOOD PRESSURE: 78 MMHG

## 2023-06-09 VITALS — DIASTOLIC BLOOD PRESSURE: 74 MMHG | SYSTOLIC BLOOD PRESSURE: 118 MMHG | WEIGHT: 239.81 LBS | BODY MASS INDEX: 38 KG/M2

## 2023-06-09 DIAGNOSIS — Z34.90 PREGNANCY: ICD-10-CM

## 2023-06-09 DIAGNOSIS — O99.210 OBESITY IN PREGNANCY: ICD-10-CM

## 2023-06-09 DIAGNOSIS — Z98.891 PREVIOUS CESAREAN SECTION: Primary | ICD-10-CM

## 2023-06-09 DIAGNOSIS — Z98.891 PREVIOUS CESAREAN SECTION: ICD-10-CM

## 2023-06-09 DIAGNOSIS — Z34.83 PRENATAL CARE, SUBSEQUENT PREGNANCY IN THIRD TRIMESTER: Primary | ICD-10-CM

## 2023-06-09 DIAGNOSIS — O09.299 HISTORY OF MACROSOMIA IN INFANT IN PRIOR PREGNANCY, CURRENTLY PREGNANT: ICD-10-CM

## 2023-06-09 PROCEDURE — 3079F DIAST BP 80-89 MM HG: CPT | Performed by: ADVANCED PRACTICE MIDWIFE

## 2023-06-09 PROCEDURE — 3074F SYST BP LT 130 MM HG: CPT | Performed by: ADVANCED PRACTICE MIDWIFE

## 2023-06-09 PROCEDURE — 3078F DIAST BP <80 MM HG: CPT | Performed by: OBSTETRICS & GYNECOLOGY

## 2023-06-09 PROCEDURE — 3074F SYST BP LT 130 MM HG: CPT | Performed by: OBSTETRICS & GYNECOLOGY

## 2023-06-09 PROCEDURE — 59025 FETAL NON-STRESS TEST: CPT | Performed by: ADVANCED PRACTICE MIDWIFE

## 2023-06-09 PROCEDURE — 76816 OB US FOLLOW-UP PER FETUS: CPT | Performed by: ADVANCED PRACTICE MIDWIFE

## 2023-06-09 PROCEDURE — 99203 OFFICE O/P NEW LOW 30 MIN: CPT | Performed by: OBSTETRICS & GYNECOLOGY

## 2023-06-09 PROCEDURE — 59025 FETAL NON-STRESS TEST: CPT

## 2023-06-09 PROCEDURE — 0502F SUBSEQUENT PRENATAL CARE: CPT | Performed by: ADVANCED PRACTICE MIDWIFE

## 2023-06-09 NOTE — NST
Nonstress Test   Patient: Roddy Castaneda    Gestation: 38w4d    Diagnosis from order: Inpatient order, no diagnosis associated Obesity in pregnancy       NST:        2023   NST DOCUMENTATION   Variability 6-25 BPM   Accelerations Yes   Decelerations None   Baseline 130 BPM   Uterine Irritability No   Contractions Not present   Nonstress Test Interpretation Reactive   Nonstress Test Second Interpretation Reactive   FHR Category Category I   NST Completed by Brendan Castleman RN   Disposition Home    Testing Plan Weekly NST   Provider Notified Luz Marina ECKERT            I agree with the above evaluation. NST completed.   Norman Ruiz CNM  2023  12:19 PM

## 2023-06-09 NOTE — PROGRESS NOTES
Radha Sonido, is at 38w4d, here for her JAZZMINE visit. Currently, she is feeling well. Denies 3rd trimester danger signs. Vital signs and weight reviewed  See flowsheets     Assessment/Plan: Growth scan ordered per request from Dr Jerald Lesches. Scheduled for *today at 4p  Next visit: 1 week. Reviewed:   Prenatal visit schedule  Kick counts  Danger signs  Labor precautions    Pt verbalized understanding. All questions answered.  No barriers to learning identified

## 2023-06-16 ENCOUNTER — HOSPITAL ENCOUNTER (OUTPATIENT)
Dept: PERINATAL CARE | Facility: HOSPITAL | Age: 33
Discharge: HOME | End: 2023-06-16
Attending: ADVANCED PRACTICE MIDWIFE
Payer: MEDICAID

## 2023-06-16 ENCOUNTER — ROUTINE PRENATAL (OUTPATIENT)
Dept: OBGYN CLINIC | Facility: CLINIC | Age: 33
End: 2023-06-16

## 2023-06-16 VITALS — DIASTOLIC BLOOD PRESSURE: 86 MMHG | SYSTOLIC BLOOD PRESSURE: 124 MMHG | WEIGHT: 239 LBS | BODY MASS INDEX: 37 KG/M2

## 2023-06-16 DIAGNOSIS — O99.210 OBESITY IN PREGNANCY: Primary | ICD-10-CM

## 2023-06-16 DIAGNOSIS — Z34.83 PRENATAL CARE, SUBSEQUENT PREGNANCY IN THIRD TRIMESTER: Primary | ICD-10-CM

## 2023-06-16 PROCEDURE — 3079F DIAST BP 80-89 MM HG: CPT | Performed by: ADVANCED PRACTICE MIDWIFE

## 2023-06-16 PROCEDURE — 59025 FETAL NON-STRESS TEST: CPT

## 2023-06-16 PROCEDURE — 0502F SUBSEQUENT PRENATAL CARE: CPT | Performed by: ADVANCED PRACTICE MIDWIFE

## 2023-06-16 PROCEDURE — 3074F SYST BP LT 130 MM HG: CPT | Performed by: ADVANCED PRACTICE MIDWIFE

## 2023-06-16 NOTE — PROGRESS NOTES
Obinna Box, is at 39w4d, here for her JAZZMINE visit. Currently, she is feeling well. Denies 3rd trimester danger signs. Some contractions each morning but no other signs of labor yet. Vital signs and weight reviewed  See flowsheets     Assessment/Plan: Pt to schedule with Júnior for delivery plan  Next visit: 1 week    Reviewed:   Prenatal visit schedule  Kick counts  Danger signs  Labor precautions    Pt verbalized understanding. All questions answered.  No barriers to learning identified

## 2023-06-18 ENCOUNTER — NST DOCUMENTATION (OUTPATIENT)
Dept: OBGYN CLINIC | Facility: CLINIC | Age: 33
End: 2023-06-18

## 2023-06-18 DIAGNOSIS — O99.210 OBESITY IN PREGNANCY: ICD-10-CM

## 2023-06-18 PROCEDURE — 59025 FETAL NON-STRESS TEST: CPT | Performed by: ADVANCED PRACTICE MIDWIFE

## 2023-06-18 NOTE — NST
Nonstress Test   Patient: Marla Ro    Gestation: 39w6d    Diagnosis from order:   obesity in pregnancy       NST:        2023   NST DOCUMENTATION   Variability 6-25 BPM   Accelerations Yes   Decelerations None   Baseline 125 BPM   Uterine Irritability No   Contractions Not present   Acoustic Stimulator No   Nonstress Test Interpretation Reactive   Nonstress Test Second Interpretation Reactive   FHR Category Category I   NST Completed by Abigail DIGGS   Disposition Home    Testing Plan Weekly NST   Provider Notified Ramone ECKERT         I agree with the above evaluation. NST completed.   Nupur Santamaria CNM  2023  10:12 AM

## 2023-06-18 NOTE — NST
Nonstress Test   Patient: Agus Eason    Gestation: 39w6d    Diagnosis from order:  obesity in preg       NST: Reactive        2023   NST DOCUMENTATION   Variability 6-25 BPM   Accelerations Yes   Decelerations None   Baseline 125 BPM   Uterine Irritability No   Contractions Not present   Acoustic Stimulator No   Nonstress Test Interpretation Reactive   Nonstress Test Second Interpretation Reactive   FHR Category Category I   NST Completed by Abigail DIGGS   Disposition Home    Testing Plan Weekly NST   Provider Notified Ramone ECKERT         I agree with the above evaluation. NST completed.   Gely Jimenez CNM  2023  10:16 AM

## 2023-06-19 ENCOUNTER — ANESTHESIA (OUTPATIENT)
Dept: OBGYN UNIT | Facility: HOSPITAL | Age: 33
End: 2023-06-19
Payer: MEDICAID

## 2023-06-19 ENCOUNTER — HOSPITAL ENCOUNTER (INPATIENT)
Facility: HOSPITAL | Age: 33
LOS: 2 days | Discharge: HOME | End: 2023-06-21
Attending: ADVANCED PRACTICE MIDWIFE | Admitting: OBSTETRICS & GYNECOLOGY
Payer: MEDICAID

## 2023-06-19 ENCOUNTER — ANESTHESIA EVENT (OUTPATIENT)
Dept: OBGYN UNIT | Facility: HOSPITAL | Age: 33
End: 2023-06-19
Payer: MEDICAID

## 2023-06-19 PROBLEM — Z34.90 PREGNANT: Status: ACTIVE | Noted: 2023-06-19

## 2023-06-19 PROBLEM — Z37.9 NORMAL LABOR (HCC): Status: ACTIVE | Noted: 2023-06-19

## 2023-06-19 PROBLEM — Z37.9 NORMAL LABOR: Status: ACTIVE | Noted: 2023-06-19

## 2023-06-19 PROBLEM — Z34.90 PREGNANT (HCC): Status: ACTIVE | Noted: 2023-06-19

## 2023-06-19 LAB
ANTIBODY SCREEN: NEGATIVE
BASOPHILS # BLD AUTO: 0.04 X10(3) UL (ref 0–0.2)
BASOPHILS NFR BLD AUTO: 0.3 %
DEPRECATED RDW RBC AUTO: 42.1 FL (ref 35.1–46.3)
EOSINOPHIL # BLD AUTO: 0.13 X10(3) UL (ref 0–0.7)
EOSINOPHIL NFR BLD AUTO: 1.1 %
ERYTHROCYTE [DISTWIDTH] IN BLOOD BY AUTOMATED COUNT: 15.4 % (ref 11–15)
HCT VFR BLD AUTO: 40.1 %
HGB BLD-MCNC: 13.3 G/DL
IMM GRANULOCYTES # BLD AUTO: 0.07 X10(3) UL (ref 0–1)
IMM GRANULOCYTES NFR BLD: 0.6 %
LYMPHOCYTES # BLD AUTO: 2.06 X10(3) UL (ref 1–4)
LYMPHOCYTES NFR BLD AUTO: 16.8 %
MCH RBC QN AUTO: 25.2 PG (ref 26–34)
MCHC RBC AUTO-ENTMCNC: 33.2 G/DL (ref 31–37)
MCV RBC AUTO: 75.9 FL
MONOCYTES # BLD AUTO: 0.81 X10(3) UL (ref 0.1–1)
MONOCYTES NFR BLD AUTO: 6.6 %
NEUTROPHILS # BLD AUTO: 9.12 X10 (3) UL (ref 1.5–7.7)
NEUTROPHILS # BLD AUTO: 9.12 X10(3) UL (ref 1.5–7.7)
NEUTROPHILS NFR BLD AUTO: 74.6 %
PLATELET # BLD AUTO: 240 10(3)UL (ref 150–450)
RBC # BLD AUTO: 5.28 X10(6)UL
RH BLOOD TYPE: POSITIVE
WBC # BLD AUTO: 12.2 X10(3) UL (ref 4–11)

## 2023-06-19 PROCEDURE — 10H073Z INSERTION OF MONITORING ELECTRODE INTO PRODUCTS OF CONCEPTION, VIA NATURAL OR ARTIFICIAL OPENING: ICD-10-PCS | Performed by: ADVANCED PRACTICE MIDWIFE

## 2023-06-19 PROCEDURE — 10907ZC DRAINAGE OF AMNIOTIC FLUID, THERAPEUTIC FROM PRODUCTS OF CONCEPTION, VIA NATURAL OR ARTIFICIAL OPENING: ICD-10-PCS | Performed by: ADVANCED PRACTICE MIDWIFE

## 2023-06-19 PROCEDURE — 0DQR0ZZ REPAIR ANAL SPHINCTER, OPEN APPROACH: ICD-10-PCS | Performed by: OBSTETRICS & GYNECOLOGY

## 2023-06-19 PROCEDURE — 10H07YZ INSERTION OF OTHER DEVICE INTO PRODUCTS OF CONCEPTION, VIA NATURAL OR ARTIFICIAL OPENING: ICD-10-PCS | Performed by: ADVANCED PRACTICE MIDWIFE

## 2023-06-19 PROCEDURE — 59300 EPISIOTOMY OR VAGINAL REPAIR: CPT | Performed by: OBSTETRICS & GYNECOLOGY

## 2023-06-19 RX ORDER — SODIUM CHLORIDE, SODIUM LACTATE, POTASSIUM CHLORIDE, CALCIUM CHLORIDE 600; 310; 30; 20 MG/100ML; MG/100ML; MG/100ML; MG/100ML
INJECTION, SOLUTION INTRAVENOUS AS NEEDED
Status: DISCONTINUED | OUTPATIENT
Start: 2023-06-19 | End: 2023-06-19

## 2023-06-19 RX ORDER — DEXTROSE, SODIUM CHLORIDE, SODIUM LACTATE, POTASSIUM CHLORIDE, AND CALCIUM CHLORIDE 5; .6; .31; .03; .02 G/100ML; G/100ML; G/100ML; G/100ML; G/100ML
INJECTION, SOLUTION INTRAVENOUS CONTINUOUS
Status: DISCONTINUED | OUTPATIENT
Start: 2023-06-19 | End: 2023-06-19

## 2023-06-19 RX ORDER — ACETAMINOPHEN 500 MG
500 TABLET ORAL EVERY 6 HOURS PRN
Status: DISCONTINUED | OUTPATIENT
Start: 2023-06-19 | End: 2023-06-19

## 2023-06-19 RX ORDER — DEXTROSE, SODIUM CHLORIDE, SODIUM LACTATE, POTASSIUM CHLORIDE, AND CALCIUM CHLORIDE 5; .6; .31; .03; .02 G/100ML; G/100ML; G/100ML; G/100ML; G/100ML
INJECTION, SOLUTION INTRAVENOUS CONTINUOUS
Status: DISCONTINUED | OUTPATIENT
Start: 2023-06-19 | End: 2023-06-19 | Stop reason: HOSPADM

## 2023-06-19 RX ORDER — SIMETHICONE 80 MG
80 TABLET,CHEWABLE ORAL 3 TIMES DAILY PRN
Status: DISCONTINUED | OUTPATIENT
Start: 2023-06-19 | End: 2023-06-21

## 2023-06-19 RX ORDER — ONDANSETRON 2 MG/ML
4 INJECTION INTRAMUSCULAR; INTRAVENOUS EVERY 6 HOURS PRN
Status: DISCONTINUED | OUTPATIENT
Start: 2023-06-19 | End: 2023-06-19 | Stop reason: ALTCHOICE

## 2023-06-19 RX ORDER — BUPIVACAINE HYDROCHLORIDE 2.5 MG/ML
INJECTION, SOLUTION EPIDURAL; INFILTRATION; INTRACAUDAL
Status: COMPLETED | OUTPATIENT
Start: 2023-06-19 | End: 2023-06-19

## 2023-06-19 RX ORDER — NALBUPHINE HYDROCHLORIDE 10 MG/ML
2.5 INJECTION, SOLUTION INTRAMUSCULAR; INTRAVENOUS; SUBCUTANEOUS
Status: DISCONTINUED | OUTPATIENT
Start: 2023-06-19 | End: 2023-06-19

## 2023-06-19 RX ORDER — TRISODIUM CITRATE DIHYDRATE AND CITRIC ACID MONOHYDRATE 500; 334 MG/5ML; MG/5ML
30 SOLUTION ORAL AS NEEDED
Status: DISCONTINUED | OUTPATIENT
Start: 2023-06-19 | End: 2023-06-19 | Stop reason: HOSPADM

## 2023-06-19 RX ORDER — ACETAMINOPHEN 500 MG
1000 TABLET ORAL EVERY 6 HOURS PRN
Status: DISCONTINUED | OUTPATIENT
Start: 2023-06-19 | End: 2023-06-19

## 2023-06-19 RX ORDER — LIDOCAINE HYDROCHLORIDE AND EPINEPHRINE 15; 5 MG/ML; UG/ML
INJECTION, SOLUTION EPIDURAL
Status: COMPLETED | OUTPATIENT
Start: 2023-06-19 | End: 2023-06-19

## 2023-06-19 RX ORDER — LIDOCAINE HYDROCHLORIDE 10 MG/ML
30 INJECTION, SOLUTION EPIDURAL; INFILTRATION; INTRACAUDAL; PERINEURAL ONCE
Status: DISCONTINUED | OUTPATIENT
Start: 2023-06-19 | End: 2023-06-19

## 2023-06-19 RX ORDER — BUPIVACAINE HCL/0.9 % NACL/PF 0.25 %
5 PLASTIC BAG, INJECTION (ML) EPIDURAL AS NEEDED
Status: DISCONTINUED | OUTPATIENT
Start: 2023-06-19 | End: 2023-06-19

## 2023-06-19 RX ORDER — TERBUTALINE SULFATE 1 MG/ML
0.25 INJECTION, SOLUTION SUBCUTANEOUS AS NEEDED
Status: DISCONTINUED | OUTPATIENT
Start: 2023-06-19 | End: 2023-06-19 | Stop reason: HOSPADM

## 2023-06-19 RX ORDER — ONDANSETRON 2 MG/ML
4 INJECTION INTRAMUSCULAR; INTRAVENOUS EVERY 6 HOURS PRN
Status: DISCONTINUED | OUTPATIENT
Start: 2023-06-19 | End: 2023-06-19

## 2023-06-19 RX ORDER — ONDANSETRON 2 MG/ML
4 INJECTION INTRAMUSCULAR; INTRAVENOUS EVERY 6 HOURS PRN
Status: DISCONTINUED | OUTPATIENT
Start: 2023-06-19 | End: 2023-06-21

## 2023-06-19 RX ORDER — DIAPER,BRIEF,INFANT-TODD,DISP
1 EACH MISCELLANEOUS EVERY 6 HOURS PRN
Status: DISCONTINUED | OUTPATIENT
Start: 2023-06-19 | End: 2023-06-21

## 2023-06-19 RX ORDER — ACETAMINOPHEN 500 MG
1000 TABLET ORAL EVERY 6 HOURS PRN
Status: DISCONTINUED | OUTPATIENT
Start: 2023-06-19 | End: 2023-06-21

## 2023-06-19 RX ORDER — ACETAMINOPHEN 500 MG
500 TABLET ORAL EVERY 6 HOURS PRN
Status: DISCONTINUED | OUTPATIENT
Start: 2023-06-19 | End: 2023-06-21

## 2023-06-19 RX ORDER — ACETAMINOPHEN 500 MG
1000 TABLET ORAL EVERY 6 HOURS PRN
Status: DISCONTINUED | OUTPATIENT
Start: 2023-06-19 | End: 2023-06-19 | Stop reason: ALTCHOICE

## 2023-06-19 RX ORDER — TRISODIUM CITRATE DIHYDRATE AND CITRIC ACID MONOHYDRATE 500; 334 MG/5ML; MG/5ML
30 SOLUTION ORAL AS NEEDED
Status: DISCONTINUED | OUTPATIENT
Start: 2023-06-19 | End: 2023-06-19

## 2023-06-19 RX ORDER — SODIUM CHLORIDE, SODIUM LACTATE, POTASSIUM CHLORIDE, CALCIUM CHLORIDE 600; 310; 30; 20 MG/100ML; MG/100ML; MG/100ML; MG/100ML
INJECTION, SOLUTION INTRAVENOUS AS NEEDED
Status: DISCONTINUED | OUTPATIENT
Start: 2023-06-19 | End: 2023-06-19 | Stop reason: HOSPADM

## 2023-06-19 RX ORDER — LIDOCAINE HYDROCHLORIDE 10 MG/ML
INJECTION, SOLUTION INFILTRATION; PERINEURAL
Status: COMPLETED | OUTPATIENT
Start: 2023-06-19 | End: 2023-06-19

## 2023-06-19 RX ORDER — BUPIVACAINE HYDROCHLORIDE 2.5 MG/ML
20 INJECTION, SOLUTION EPIDURAL; INFILTRATION; INTRACAUDAL ONCE
Status: COMPLETED | OUTPATIENT
Start: 2023-06-19 | End: 2023-06-19

## 2023-06-19 RX ORDER — ACETAMINOPHEN 500 MG
500 TABLET ORAL EVERY 6 HOURS PRN
Status: DISCONTINUED | OUTPATIENT
Start: 2023-06-19 | End: 2023-06-19 | Stop reason: ALTCHOICE

## 2023-06-19 RX ORDER — CHOLECALCIFEROL (VITAMIN D3) 25 MCG
1 TABLET,CHEWABLE ORAL DAILY
Status: DISCONTINUED | OUTPATIENT
Start: 2023-06-19 | End: 2023-06-21

## 2023-06-19 RX ORDER — BISACODYL 10 MG
10 SUPPOSITORY, RECTAL RECTAL ONCE AS NEEDED
Status: DISCONTINUED | OUTPATIENT
Start: 2023-06-19 | End: 2023-06-21

## 2023-06-19 RX ORDER — AMMONIA INHALANTS 0.04 G/.3ML
0.3 INHALANT RESPIRATORY (INHALATION) AS NEEDED
Status: DISCONTINUED | OUTPATIENT
Start: 2023-06-19 | End: 2023-06-21

## 2023-06-19 RX ORDER — DOCUSATE SODIUM 100 MG/1
100 CAPSULE, LIQUID FILLED ORAL
Status: DISCONTINUED | OUTPATIENT
Start: 2023-06-19 | End: 2023-06-21

## 2023-06-19 RX ORDER — TERBUTALINE SULFATE 1 MG/ML
0.25 INJECTION, SOLUTION SUBCUTANEOUS AS NEEDED
Status: DISCONTINUED | OUTPATIENT
Start: 2023-06-19 | End: 2023-06-19

## 2023-06-19 RX ORDER — IBUPROFEN 600 MG/1
600 TABLET ORAL ONCE AS NEEDED
Status: DISCONTINUED | OUTPATIENT
Start: 2023-06-19 | End: 2023-06-19 | Stop reason: HOSPADM

## 2023-06-19 RX ORDER — LIDOCAINE HYDROCHLORIDE 10 MG/ML
30 INJECTION, SOLUTION EPIDURAL; INFILTRATION; INTRACAUDAL; PERINEURAL ONCE
Status: DISCONTINUED | OUTPATIENT
Start: 2023-06-19 | End: 2023-06-19 | Stop reason: HOSPADM

## 2023-06-19 RX ADMIN — BUPIVACAINE HYDROCHLORIDE 3 ML: 2.5 INJECTION, SOLUTION EPIDURAL; INFILTRATION; INTRACAUDAL at 14:17:00

## 2023-06-19 RX ADMIN — LIDOCAINE HYDROCHLORIDE AND EPINEPHRINE 3 ML: 15; 5 INJECTION, SOLUTION EPIDURAL at 14:12:00

## 2023-06-19 RX ADMIN — LIDOCAINE HYDROCHLORIDE 5 ML: 10 INJECTION, SOLUTION INFILTRATION; PERINEURAL at 14:05:00

## 2023-06-19 NOTE — ANESTHESIA PROCEDURE NOTES
Labor Analgesia    Date/Time: 6/19/2023 2:05 PM    Performed by: Jayne Barber MD  Authorized by: Jayne Barber MD      General Information and Staff    Start Time:  6/19/2023 2:05 PM  End Time:  6/19/2023 2:24 PM  Anesthesiologist:  Jayne Barber MD  Performed by:   Anesthesiologist  Patient Location:  OB  Reason for Block: labor epidural    Preanesthetic Checklist: patient identified, IV checked, site marked, risks and benefits discussed, monitors and equipment checked, pre-op evaluation, timeout performed, anesthesia consent and sterile technique used      Procedure Details    Patient Position:  Sitting  Prep: ChloraPrep    Monitoring:  Heart rate  Approach:  Midline    Epidural Needle    Injection Technique:  RUFINO air  Needle Type:  Tuohy  Needle Gauge:  18 G  Needle Length:  3.5 in  Location:  L2-3    Spinal Needle      Catheter    Catheter Type:  Multi-orifice  Catheter Size:  20 G  Test Dose:  Negative    Assessment      Additional Comments

## 2023-06-19 NOTE — PROGRESS NOTES
Pt is a 28year old female admitted to TR4/TR4-A. Patient presents with:  R/o Labor: Óscar, + FM, history of . Pt is  40w0d intra-uterine pregnancy. History obtained, consents signed. Oriented to room, staff, and plan of care.

## 2023-06-19 NOTE — PROGRESS NOTES
Signed                  Asked by CNM to see pt re: 3rd degree laceration. Patient examined and I agree with 3rd degree laceration. 3rd degree laceration grasped with alices and closed posteriorly, superiorly, anteriorly and inferiorly with figure of 8 stitched of 2.0 vicryl. 1% lidocaine used for anesthesia.

## 2023-06-20 LAB
BASOPHILS # BLD AUTO: 0.05 X10(3) UL (ref 0–0.2)
BASOPHILS NFR BLD AUTO: 0.3 %
DEPRECATED RDW RBC AUTO: 42.8 FL (ref 35.1–46.3)
EOSINOPHIL # BLD AUTO: 0.05 X10(3) UL (ref 0–0.7)
EOSINOPHIL NFR BLD AUTO: 0.3 %
ERYTHROCYTE [DISTWIDTH] IN BLOOD BY AUTOMATED COUNT: 15.3 % (ref 11–15)
HCT VFR BLD AUTO: 36.4 %
HGB BLD-MCNC: 11.9 G/DL
IMM GRANULOCYTES # BLD AUTO: 0.1 X10(3) UL (ref 0–1)
IMM GRANULOCYTES NFR BLD: 0.5 %
LYMPHOCYTES # BLD AUTO: 2.03 X10(3) UL (ref 1–4)
LYMPHOCYTES NFR BLD AUTO: 10.9 %
MCH RBC QN AUTO: 25.3 PG (ref 26–34)
MCHC RBC AUTO-ENTMCNC: 32.7 G/DL (ref 31–37)
MCV RBC AUTO: 77.3 FL
MONOCYTES # BLD AUTO: 0.86 X10(3) UL (ref 0.1–1)
MONOCYTES NFR BLD AUTO: 4.6 %
NEUTROPHILS # BLD AUTO: 15.52 X10 (3) UL (ref 1.5–7.7)
NEUTROPHILS # BLD AUTO: 15.52 X10(3) UL (ref 1.5–7.7)
NEUTROPHILS NFR BLD AUTO: 83.4 %
PLATELET # BLD AUTO: 216 10(3)UL (ref 150–450)
RBC # BLD AUTO: 4.71 X10(6)UL
WBC # BLD AUTO: 18.6 X10(3) UL (ref 4–11)

## 2023-06-20 NOTE — CM/SW NOTE
SW self referral due to finances/WIC resources; Infant admission to [facility]    SW met with patient and FOB [facility]  bedside. SW confirmed face sheet contact as correct. Baby boy/girl name:Baby luis felipe Moran  Date & time of delivery:6/19/23 @ 6:03pm  Delivery method:Vaginal birth after caesarian  Siblings age:2 yr old    Patient employed:Denied  Length of maternity leave:n/a    Father of baby employed:Yes  Length of paternity leave:Denied    Breast or formula feed:Breast and formula feed    Pediatrician:TBD  SW encouraged pt to schedule infant first appointment (usually within 48 hours of discharge) prior to pt discharge. Pt expressed understanding. Infant Insurance:Medicaid  Change HC contacted:Yes    Mental Health History: Denied    Medications:n/a    Therapist:n/a    Psychiatrist:n/a    SW discussed signs, symptoms and risks associated with post partum depression & anxiety. SW provided pt with PMAD resources. Other resources provided:Medicaid transportation and [facility] specific resources. Pt endorses being current w/WIC services. Referral made to [employer] per pt request.    Patient support system: FOB    Patient denied current questions/needs from SW.    SW/CM to remain available for support and/or discharge planning.       Eddie Ray, MSW, [facility]  Social Work   ABV:#[phone]

## 2023-06-20 NOTE — PROGRESS NOTES
Patient up to bathroom with assist x 2. Unable to void at this time. Bladder scanned 644mL, straight cath 650mL. Patient transferred to mother/baby room 368 per wheelchair in stable condition with  personal belongings. Accompanied by significant other and staff. Visited baby in [facility] prior to transfer. Report given to mother/baby DONTAE Mark.

## 2023-06-20 NOTE — ANESTHESIA POSTPROCEDURE EVALUATION
Patient: Charbel Maldonado    Procedure Summary     Date: 06/19/23 Room / Location:     Anesthesia Start: 5741 Anesthesia Stop: 0036    Procedure: LABOR ANALGESIA Diagnosis:     Scheduled Providers:  Anesthesiologist:     Anesthesia Type: epidural ASA Status: 2          Anesthesia Type: epidural    Vitals Value Taken Time   /68 06/19/23 1901   Temp  06/19/23 2022   Pulse 72 06/19/23 1901   Resp * 06/19/23 2022   SpO2 96 % 06/19/23 1755   Vitals shown include unvalidated device data.     [facility] AN Post Evaluation:   Patient Evaluated in floor  Patient Participation: complete - patient participated  Level of Consciousness: awake and alert  Pain Management: adequate  Airway Patency:patent  Dental exam unchanged from preop  Yes    Cardiovascular Status: acceptable  Respiratory Status: acceptable  Postoperative Hydration acceptable      Janee Valverde MD  6/19/2023 8:22 PM

## 2023-06-20 NOTE — L&D DELIVERY NOTE
[facility]    Vaginal Delivery Note    Ken Houston Patient Status:  Inpatient    12/3/1990 MRN [id number]   Location [address] Attending Lisa Angeles, [address] Day # 0 PCP No primary care provider on file. Delivery     Infant  Date of Delivery: 2023   Time of Delivery: 6:03 PM  Delivery Type: Vaginal birth after caesarian    Infant Sex/Birthweight: female 8 lb 3.2 oz (3.72 kg)     Presentation Vertex [1]  Position      LAVERNE    Apgars:  1 minute: 8               5 minutes: 8                        10 minutes:      Placenta  Date/Time of Delivery: 2023  6:07 PM   Delivery: spontaneous  Placenta to Pathology: no  Cord Gases Submitted: yes  Cord Blood Collection: yes  Cord Tissue Collection: no  Cord Complications: none  Sponge and Needle Counts:  Verified yes    Maternal Anesthesia: epidural and IV sedation   Episiotomy/Laceration Repair  Laceration: perineal 3rd degree  Location: midline  Suture Size/Type: 2-0 Vicryl and 3-0 Vicryl  Anesthesia: 1% lidocaine    Delivery Complications  none    Neonatologist Present: no  Delivery Comment: Norcarlos Amend progressed to complete dilatation and +1 station prior to pushing successfully to viable baby girl. FSE placed during pushing due to decels an difficulty tracing FHT. See Delivery Summary above for time, APGARs, and weight. Fetal head presented in the LAVERNE position. Sweep for nuchal cord was performed and no nuchal cord identified. Anterior shoulder delivered spontaneously without traction. Baby vigorous at birth. To maternal abdomen for dry and stimulation then cord cut after a minute and baby brought to warmer to better assess color. Prophylactic IV pitocin initiated in 3rd stage. Spontaneous placenta by pham mechanism, complete with 3 vessel cord. Hemostasis achieved by fundal massage and prophylactic IV Pitocin. Vagina and perineum inspected and 3rd degree perineal laceration noted.  Dr Tarun Hernandez called in to repair 3rd degree. 3rd degree repaired by Dr Nirmala Shultz with 2-0 Vicryl under local anesthesia. Remainder of 2nd degree repaired by myself. Mother and infant appeared in stable condition when midwife left the delivery room. Sharps and 4x4 counts were correct. Baby brought to nursery for further evaluation due to inability to regulate temperature and low O2 sats. Baby also appears to have downs features. I spoke with Pietro and she will assess baby when she comes in and notify parents if agrees with findings.      Quantitative Blood Loss (mL) [address], [place]   6/19/2023  8:27 PM

## 2023-06-21 VITALS
TEMPERATURE: 98 F | HEART RATE: 72 BPM | RESPIRATION RATE: 18 BRPM | SYSTOLIC BLOOD PRESSURE: 125 MMHG | OXYGEN SATURATION: 98 % | DIASTOLIC BLOOD PRESSURE: 77 MMHG

## 2023-06-21 RX ORDER — PSEUDOEPHEDRINE HCL 30 MG
100 TABLET ORAL 2 TIMES DAILY
Qty: 60 CAPSULE | Refills: 1 | Status: SHIPPED | COMMUNITY
Start: 2023-06-21

## 2023-06-21 RX ORDER — PSEUDOEPHEDRINE HCL 30 MG
100 TABLET ORAL 2 TIMES DAILY
Qty: 60 CAPSULE | Refills: 1 | Status: SHIPPED | OUTPATIENT
Start: 2023-06-21 | End: 2023-06-21

## 2023-06-21 NOTE — CM/SW NOTE
MDO to JAMIA for EDPS 8. Pt was assessed and provided with PMAD resources on 6/20. SW/CM to remain available for support and/or discharge planning.      BRANDON Banegas, [facility]  Social Work   KSU:#[id number]

## 2023-07-06 ENCOUNTER — POSTPARTUM (OUTPATIENT)
Dept: OBGYN CLINIC | Facility: CLINIC | Age: 33
End: 2023-07-06

## 2023-07-06 VITALS
HEART RATE: 72 BPM | WEIGHT: 217 LBS | SYSTOLIC BLOOD PRESSURE: 119 MMHG | HEIGHT: 67 IN | DIASTOLIC BLOOD PRESSURE: 82 MMHG | BODY MASS INDEX: 34.06 KG/M2

## 2023-07-06 PROCEDURE — 3079F DIAST BP 80-89 MM HG: CPT | Performed by: ADVANCED PRACTICE MIDWIFE

## 2023-07-06 PROCEDURE — 3074F SYST BP LT 130 MM HG: CPT | Performed by: ADVANCED PRACTICE MIDWIFE

## 2023-07-06 PROCEDURE — 3008F BODY MASS INDEX DOCD: CPT | Performed by: ADVANCED PRACTICE MIDWIFE

## 2023-07-06 PROCEDURE — 0503F POSTPARTUM CARE VISIT: CPT | Performed by: ADVANCED PRACTICE MIDWIFE

## 2023-07-06 NOTE — PROGRESS NOTES
Final Anesthesia Post-op Assessment    Patient: Alexa Rucker  Procedure(s) Performed: ESOPHAGOGASTRODUODENOSCOPY  Anesthesia type: Monitor Anesthesia Care    Vitals Value Taken Time   Temp 97.5 4/22/2019  1:03 PM   Pulse 60 4/22/2019 12:41 PM   Resp 25 4/22/2019 12:41 PM   BP 93/55 4/22/2019  1:03 PM   SpO2 94 4/22/2019  1:03 PM   Vitals shown include unvalidated device data.    Last 24 I/O:     Intake/Output Summary (Last 24 hours) at 4/22/2019 1303  Last data filed at 4/22/2019 1256  Gross per 24 hour   Intake 400 ml   Output --   Net 400 ml       PATIENT LOCATION: [facility]  POST-OP VITAL SIGNS: stable  LEVEL OF CONSCIOUSNESS: participates in exam, awake, oriented and answers questions appropriately  RESPIRATORY STATUS: spontaneous ventilation, unassisted and room air  CARDIOVASCULAR: stable  HYDRATION: euvolemic    PAIN MANAGEMENT: well controlled  NAUSEA: None  AIRWAY PATENCY: patent  POST-OP ASSESSMENT: no complications, patient tolerated procedure well with no complications and sufficiently recovered from acute administration of anesthesia effects and able to participate in evaluation  COMPLICATIONS: none       Subjective: Abrahan Mark is 2 weeks postpartum after a vaginal delivery of a baby girl. See L&D delivery summary for birth statistics. She is without concerns today. Bleeding is decreasing and not experiencing any excessive pain. She reports her baby is still in the nursery due to oxygen issues. Baby does not have any cardiac defect. She is really struggling with the diagnosis of down syndrome. She has questions on the likelihood of her next baby having down syndrome and her baby's future life and mental capacity. Pumping successfully and reports infant is experiencing adequate weight gain. She denies any signs/symptoms of postpartum depression and has adequate family support. Denies any abuse. Contraceptive plan: undecided    Review of Systems   Constitutional:  Negative for chills and fever. Eyes:  Negative for blurred vision. Respiratory:  Negative for cough, shortness of breath and wheezing. Cardiovascular:  Negative for chest pain and palpitations. Gastrointestinal:  Negative for diarrhea, nausea and vomiting. Genitourinary:  Negative for dysuria and frequency. Neurological:  Negative for dizziness and headaches. Psychiatric/Behavioral:  Negative for depression and suicidal ideas. Objective: There were no vitals filed for this visit. Wt Readings from Last 6 Encounters:  07/06/23 : 217 lb (98.4 kg)  06/16/23 : 239 lb (108.4 kg)  06/09/23 : 239 lb (108.4 kg)  06/09/23 : 239 lb 12.8 oz (108.8 kg)  06/02/23 : 237 lb (107.5 kg)  05/26/23 : 234 lb (106.1 kg)      Physical Exam  Constitutional:       General: She is not in acute distress. Appearance: Normal appearance. She is not ill-appearing. HENT:      Head: Normocephalic and atraumatic. Cardiovascular:      Rate and Rhythm: Normal rate. Pulmonary:      Effort: Pulmonary effort is normal. No respiratory distress. Neurological:      Mental Status: She is alert and oriented to person, place, and time.    Psychiatric: Mood and Affect: Mood normal.         Behavior: Behavior normal.         Thought Content: Thought content normal.         Judgment: Judgment normal.   Vitals reviewed. Assessment/Plan:   2 weeks postpartum, stable. Pumping and bottle feeding without difficulty  Contraception: undecided  Today's Plan: Discussed that each new pregnancy has different risk for down syndrome and having 1 child with down syndrome does not necessarily increase risk but increasing age does. Discussed that unfortunately only time will tell what kind of developmental delays her baby may have due to DS. This was also told to her by the pediatrician. Discussed that many people with DS live full, beautiful lives and bring much josseline to their families. Reassurance provided that her concerns and fears are very normal. Warning signs for PPD reviewed and pt encouraged to reach out if she has any concerns. She verbalized understanding and agreement. Return to clinic: 4 weeks for 6w PPV    Counseling included:   Signs/symptoms of postpartum depression  Postpartum danger signs  Lactation support and troubleshooting  Maternal and family adaption  Sleep/rest strategies  Sexuality  Infant safety and care  Parenting concerns  Occupational concerns  Contraception    Melia verbalized understanding of plan of care. All questions answered. No barriers to learning identified.

## 2023-07-07 PROBLEM — Z34.90 PREGNANT: Status: RESOLVED | Noted: 2023-06-19 | Resolved: 2023-07-07

## 2023-07-07 PROBLEM — Z34.90 PREGNANCY: Status: RESOLVED | Noted: 2022-11-18 | Resolved: 2023-07-07

## 2023-07-07 PROBLEM — O09.299: Status: ACTIVE | Noted: 2023-07-07

## 2023-07-07 PROBLEM — Z34.90 PREGNANT (HCC): Status: RESOLVED | Noted: 2023-06-19 | Resolved: 2023-07-07

## 2023-07-07 PROBLEM — Z37.9 NORMAL LABOR (HCC): Status: RESOLVED | Noted: 2023-06-19 | Resolved: 2023-07-07

## 2023-07-07 PROBLEM — Z37.9 NORMAL LABOR: Status: RESOLVED | Noted: 2023-06-19 | Resolved: 2023-07-07

## 2023-07-07 PROBLEM — O09.299 PREVIOUS CHILD WITH DOWN SYNDROME, ANTEPARTUM: Status: ACTIVE | Noted: 2023-07-07

## 2023-07-07 PROBLEM — Z34.90 PREGNANCY (HCC): Status: RESOLVED | Noted: 2022-11-18 | Resolved: 2023-07-07

## 2023-07-20 ENCOUNTER — TELEPHONE (OUTPATIENT)
Dept: OBGYN UNIT | Facility: HOSPITAL | Age: 33
End: 2023-07-20

## 2023-08-04 ENCOUNTER — POSTPARTUM (OUTPATIENT)
Dept: OBGYN CLINIC | Facility: CLINIC | Age: 33
End: 2023-08-04

## 2023-08-04 VITALS
SYSTOLIC BLOOD PRESSURE: 109 MMHG | HEIGHT: 67 IN | HEART RATE: 76 BPM | WEIGHT: 216 LBS | BODY MASS INDEX: 33.9 KG/M2 | DIASTOLIC BLOOD PRESSURE: 76 MMHG

## 2023-08-04 DIAGNOSIS — Z12.4 SCREENING FOR CERVICAL CANCER: ICD-10-CM

## 2023-08-04 PROCEDURE — 3008F BODY MASS INDEX DOCD: CPT | Performed by: ADVANCED PRACTICE MIDWIFE

## 2023-08-04 PROCEDURE — 3078F DIAST BP <80 MM HG: CPT | Performed by: ADVANCED PRACTICE MIDWIFE

## 2023-08-04 PROCEDURE — 3074F SYST BP LT 130 MM HG: CPT | Performed by: ADVANCED PRACTICE MIDWIFE

## 2023-08-04 PROCEDURE — 0503F POSTPARTUM CARE VISIT: CPT | Performed by: ADVANCED PRACTICE MIDWIFE

## 2023-08-04 NOTE — PATIENT INSTRUCTIONS
Postpartum support international ([email or web address]. net) has a [address] [place], [place] [address]mary [facility] support group and a mental health support for special needs and medically fragile parenting group.    [email or web address]

## 2023-08-04 NOTE — PROGRESS NOTES
Patient here for postpartum check-up.vaginal birth after  () This patient has no babies on file. of a viable baby girl with [employer]. Delivery complicated by 3rd degree laceration repaired by MD and baby with Down syndrome. OB History    Para Term  AB Living   2 2 2 0 0 2   SAB IAB Ectopic Multiple Live Births   0 0 0 0 2      # Outcome Date GA Lbr Terrell/2nd Weight Sex Delivery Anes PTL Lv   2 Term 23 40w0d 09:09 / 00:54 8 lb 3.2 oz (3.72 kg) F VAGINAL SHELLEY EPI N GHISLAINE   1 Term 10/30/20 41w1d  9 lb 8.7 oz (4.33 kg) M CS-LTranv EPI  GHISLAINE      Complications: Failure to Progress in Second Stage        Lab Results   Component Value Date    WBC 18.6 (H) 2023    HGB 11.9 (L) 2023    HCT 36.4 2023    .0 2023    NEPERCENT 83.4 2023    LYPERCENT 10.9 2023    MOPERCENT 4.6 2023    EOPERCENT 0.3 2023    BAPERCENT 0.3 2023    NE 15.52 (H) 2023    LYMABS 2.03 2023    MOABSO 0.86 2023    EOABSO 0.05 2023    BAABSO 0.05 2023         Breastfeeding & bottle feeding. Baby with adequate weight gain. Denies fevers, chills, body aches and flu-like symptoms. Denies abdominal pain, no pelvic pain, reports no vaginal bleeding. Bleeding stopped 1 week ago     Denies dysuria, urinary frequency and urinary incontinence. Denies constipation, painful bowel movements and fecal incontinence. Denies symptoms of postpartum depression including mood, affect, appetite / activity level changes. Has adequate support at home. Coping better. Still do worry about the life their daughter will have and who will care for her when they are no longer around. Worry about what will happen with next pregnancy. Perineum concerns: No pain, no hemorrhoids, 3rd degree perineal laceration repair. Reports spotting with intercourse.  Slight pain  EPDS Score:  8  MICHELINE screen: 0  Considering Condoms for [facility] method      Physical Exam  Vitals reviewed. Constitutional:       General: She is not in acute distress. Appearance: Normal appearance. She is well-developed. She is not ill-appearing. Neck:      Thyroid: No thyroid mass or thyromegaly. Pulmonary:      Effort: Pulmonary effort is normal.   Chest:   Breasts:     Right: No mass, skin change or tenderness. Nipple discharge: lactating. Left: No mass, skin change or tenderness. Nipple discharge: lactating. Genitourinary:     General: Normal vulva. Pubic Area: No rash. Labia:         Right: No rash, tenderness, lesion or injury. Left: No rash, tenderness, lesion or injury. Urethra: No prolapse, urethral pain, urethral swelling or urethral lesion. Vagina: Normal. No signs of injury and foreign body. No vaginal discharge, erythema, tenderness, bleeding, lesions or prolapsed vaginal walls. Cervix: No cervical motion tenderness, discharge, friability, lesion, erythema, cervical bleeding or eversion. Uterus: Normal. Not deviated, not enlarged, not fixed, not tender and no uterine prolapse. Adnexa: Right adnexa normal and left adnexa normal.      Rectum: No tenderness. Normal anal tone. Comments: Perineum intact. Scant spotting from scar tissue at introitus of laceration repair. Rectal sphincter intact  Neurological:      Mental Status: She is alert and oriented to person, place, and time. Psychiatric:         Speech: Speech normal.         Behavior: Behavior normal.         Thought Content: Thought content normal.         Judgment: Judgment normal.        1FB Diastasis Recti    A: Normal PP involution      Breastfeeding/bottle feeding      Cervical cancer screening  P: Continue to breastfeed, continue PNV while breastfeeding and for preconception. Condoms for [facility] method      Contact info for PP support international given. We discussed that many children with down syndrome can live happy, full lives.  Discussed low risk of re-occurrence and discussed screening and diagnostic options for future pregnancies if desires. We discussed her 3rd degree laceration and that still needs more healing time. Do not recommend getting pregnant right away, waiting at least 1-1 1/2 yrs between children. Recommend sitz baths. If not improved in a few weeks can consider tx with silver nitrate &/or estrogen cream. Pelvic floor PT if remains an issue long term.        Pap smear done today    F/U 1 yr for annual exam or PRN

## 2023-08-07 LAB — HPV I/H RISK 1 DNA SPEC QL NAA+PROBE: NEGATIVE

## 2023-09-21 ENCOUNTER — TELEPHONE (OUTPATIENT)
Dept: OBGYN CLINIC | Facility: CLINIC | Age: 33
End: 2023-09-21

## 2023-09-21 NOTE — TELEPHONE ENCOUNTER
Incoming Fax Received from [facility]. Start Date of 5/30/23. Request for blood pressure cuff. No hypertension noted in chart.

## 2024-10-11 ENCOUNTER — HOSPITAL ENCOUNTER (OUTPATIENT)
Dept: ULTRASOUND IMAGING | Facility: HOSPITAL | Age: 34
Discharge: HOME | End: 2024-10-11
Attending: ADVANCED PRACTICE MIDWIFE
Payer: MEDICAID

## 2024-10-11 ENCOUNTER — OFFICE VISIT (OUTPATIENT)
Dept: OBGYN CLINIC | Facility: CLINIC | Age: 34
End: 2024-10-11

## 2024-10-11 VITALS — WEIGHT: 199 LBS | BODY MASS INDEX: 31 KG/M2 | SYSTOLIC BLOOD PRESSURE: 118 MMHG | DIASTOLIC BLOOD PRESSURE: 62 MMHG

## 2024-10-11 DIAGNOSIS — O36.80X0 ENCOUNTER TO DETERMINE FETAL VIABILITY OF PREGNANCY, SINGLE OR UNSPECIFIED FETUS (HCC): ICD-10-CM

## 2024-10-11 DIAGNOSIS — K59.01 SLOW TRANSIT CONSTIPATION: ICD-10-CM

## 2024-10-11 DIAGNOSIS — O09.299: ICD-10-CM

## 2024-10-11 DIAGNOSIS — N92.6 MISSED MENSES: Primary | ICD-10-CM

## 2024-10-11 LAB
CONTROL LINE PRESENT WITH A CLEAR BACKGROUND (YES/NO): YES YES/NO
KIT LOT #: NORMAL NUMERIC
PREGNANCY TEST, URINE: POSITIVE

## 2024-10-11 PROCEDURE — 76801 OB US < 14 WKS SINGLE FETUS: CPT | Performed by: ADVANCED PRACTICE MIDWIFE

## 2024-10-11 PROCEDURE — 99214 OFFICE O/P EST MOD 30 MIN: CPT | Performed by: ADVANCED PRACTICE MIDWIFE

## 2024-10-11 PROCEDURE — 81025 URINE PREGNANCY TEST: CPT | Performed by: ADVANCED PRACTICE MIDWIFE

## 2024-10-11 RX ORDER — PNV NO.95/FERROUS FUM/FOLIC AC 28MG-0.8MG
1 TABLET ORAL DAILY
Qty: 90 TABLET | Refills: 3 | Status: SHIPPED | OUTPATIENT
Start: 2024-10-11 | End: 2024-11-10

## 2024-10-11 RX ORDER — ASPIRIN 81 MG
100 TABLET, DELAYED RELEASE (ENTERIC COATED) ORAL 2 TIMES DAILY
Qty: 60 TABLET | Refills: 3 | Status: SHIPPED | OUTPATIENT
Start: 2024-10-11

## 2024-10-11 NOTE — PROGRESS NOTES
CHIEF COMPLAINT:  Chief Complaint   Patient presents with    Prenatal Care     Pt here for missed menses        HPI:  Melia is 33 year old, female, here today for a missed menses visit.  Currently, she is feeling well. Denies 1st trimester danger signs.   Reports severe constipation, similar to her other pregnancies. Has been having nausea but it is tolerable.   Patient and  want chromosomal testing as early as possible in the pregnancy. They are considering invasive testing for diagnostic results vs screening and would like to review these options with MFM.     Patient's last menstrual period was 2024 (exact date).  Menarche: 13       , term C/S for failure to progress, boy, 9#8oz  , term , girl, 8#3oz  Current, denies 1T danger signs.   LMP 1124 --> 8w5d --> VICKEY 25    /FOB: Reji  Family H/O of genetic disorders: Has daughter with trisomy 21  Cats at home: No. 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 No pertinent family history.   Social History:   Social History     Socioeconomic History    Marital status:     Number of children: 1    Years of education: 18    Highest education level: Master's degree (e.g., MA, MS, [place], MEd, MSW, DANGELO)   Tobacco Use    Smoking status: Never     Passive exposure: Never    Smokeless tobacco: Never   Vaping Use    Vaping status: Never Used   Substance and Sexual Activity    Alcohol use: Never    Drug use: Never    Sexual activity: Yes     Partners: Male    Other Topics Concern    Blood Transfusions Yes    Caffeine Concern Yes    Special Diet No    Exercise No    Seat Belt Yes     Social Drivers of Health     Financial Resource Strain: Low Risk  (6/19/2023)    Financial Resource Strain     Difficulty of Paying Living Expenses: Not very hard     Med Affordability: No   Food Insecurity: No Food Insecurity (6/19/2023)    Food Insecurity     Food Insecurity: Never true   Transportation Needs: No Transportation Needs (6/19/2023)    Transportation Needs     Lack of Transportation: No   Stress: No Stress Concern Present (6/19/2023)    Stress     Feeling of Stress : No   Housing Stability: Low Risk  (6/19/2023)    Housing Stability     Housing Instability: No       Safe relationship/safe home environment     Medications (Active prior to today's visit):  Current Outpatient Medications   Medication Sig Dispense Refill    docusate sodium 100 MG Oral Tab Take 1 tablet (100 mg total) by mouth 2 (two) times daily. 60 tablet 3    Prenatal Vit-Fe Fumarate-FA (PRENATAL VITAMIN) 27-0.8 MG Oral Tab Take 1 tablet by mouth daily. 90 tablet 3    Prenatal Vit-Fe Fumarate-FA (PRENATAL VITAMINS) 28-0.8 MG Oral Tab          Allergies:  Allergies[1]    REVIEW OF SYSTEMS:  Review of Systems   Gastrointestinal:  Positive for constipation and nausea.   All other systems reviewed and are negative.       PHYSICAL EXAM:  Vitals:    10/11/24 1022   BP: 118/62     Physical Exam  Vitals reviewed.   Constitutional:       Appearance: Normal appearance.   HENT:      Head: Normocephalic.   Pulmonary:      Effort: Pulmonary effort is normal.   Neurological:      Mental Status: She is alert and oriented to person, place, and time.   Psychiatric:         Behavior: Behavior normal.         Thought Content: Thought content normal.         Judgment: Judgment normal.        Recent Results (from the past 24 hours)   POC Urine pregnancy test [[id number]]    Collection Time: 10/11/24 10:12 AM   Result Value Ref Range     Pregnancy Test, Urine positive     Control Line Present with a clear background (yes/no) yes Yes/No    Kit Lot # [id number] Numeric    Kit Expiration Date 11-12-25 Date        ASSESSMENT/PLAN:   Melia was seen today for prenatal care.    Diagnoses and all orders for this visit:    Missed menses  -     POC Urine pregnancy test [[id number]]  -     MATERNAL FETAL MEDICINE - INTERNAL  -     Prenatal Vit-Fe Fumarate-FA (PRENATAL VITAMIN) 27-0.8 MG Oral Tab; Take 1 tablet by mouth daily.    Encounter to determine fetal viability of pregnancy, single or unspecified fetus (HCC)  -     US PREG 1ST TRIMESTER (CPT=76801); Future    Child with Down syndrome  -     MATERNAL FETAL MEDICINE - INTERNAL    Slow transit constipation  -     docusate sodium 100 MG Oral Tab; Take 1 tablet (100 mg total) by mouth 2 (two) times daily.         Today's UCG positive result reviewed with patient. 1T ultrasound ordered to confirm viability and MFM consult placed for 1T screen vs [facility] vs amniocentesis  Appropriate for CNM care  eRx prenatal vitamins  baby ASA not indicated. Pre-eclampsia Risk Assessment: low risk    Next visit: Patient to schedule Nurse Education visit, next available, and NOB for 12wga    Counseling included:  -- CNM care  -- Discussed importance of folic acid  -- Reviewed pregnancy recommendations regarding diet/hydration, and food safety  -- Travel discussed, avoid travel to zika zones, use of support stockings with flights longer than 3 hours, movement every 2-3 hours if driving  -- Discussed exercise  -- Discussed avoidance of Jacuzzis, saunas, hot tubs and steam rooms  -- Discussed avoidance of alcohol, smoking, and minimizing caffeine  -- Warning signs reviewed. Advised to call office if occur. Safe use of [employer] for messaging  -- Reviewed genetic/chromosomal testing options for pregnancies including difference between screening and diagnostic with risks/benefits  -- Schedule RN OB ED visit   -- I have spent 30 minutes total  time on the day of the encounter, including: preparing to see the patient, ordering/reviewing labs, performing a medically appropriate exam, and providing care coordination. face to face counseling, chart review, orders and coordination of care    Pt verbalized understanding. All questions answered. No barriers to learning identified    RAEGAN Duque under direct supervision of Nya Cardona CNM    Patient seen in conjunction with WINDY. I personally witnessed the patient's exam and medical decision making on this date of service.  I was physically present in the room for the performance of the E/M service.  I have reviewed the CNM student's documentation and findings including history, Exam, and Medical Decision Making, edited the document for accuracy and verify that it represents the clinical findings and services performed.           [1]   Allergies  Allergen Reactions    Ibuprofen NAUSEA AND VOMITING    Pork Derived Products OTHER (SEE COMMENTS)     Restoration

## 2024-10-11 NOTE — PATIENT INSTRUCTIONS
Healthy Eating Habits During Pregnancy    It’s important to develop healthy eating habits while you are pregnant, for you as well as for your baby. Here are some ways to stay healthy.  Aim for a healthy weight  A slow, steady rate of weight gain is often best. After the first trimester, you may gain about a pound a week. If you were overweight before pregnancy, you need to gain fewer pounds. Your healthcare provider can give you a healthy weight goal for your pregnancy.  Don’t diet  Now is not the time to diet. You may not get enough of the nutrients you and your baby need. Instead, learn how to be a healthy eater. Start by doing it for your baby. Soon, you may do it for yourself.  Vitamins and supplements  Talk with your healthcare provider about taking these and other prenatal vitamins and supplements.  Iron makes the extra blood you need now.  Calcium and vitamin D help build and keep strong bones.  Folic acid helps prevent certain birth defects.  Iodine helps the thyroid work right.  Some vitamins may not be safe to take. Your healthcare provider will tell you which ones to avoid.  Fluids  Drink at least 8 to 10 cups of fluid daily. Your baby needs fluids. Fluids also decrease constipation, flush out toxins and waste, limit swelling, and help prevent bladder infections. Water is best. Other good choices are:  Water or seltzer water with a slice of lemon or lime (These can also help ease an upset stomach.)  Clear soups that are low in salt  Low-fat or fat-free milk, soy or rice milk with calcium added  Popsicles or gelatin  Things to avoid  Some things might harm your growing baby. Don’t eat or drink:  Alcohol  Unpasteurized dairy foods and juices  Raw or undercooked meat, poultry, fish, or eggs  Unwashed fruits and vegetables  Prepared meats, like deli meats or hot dogs, unless heated until steaming hot  Fish that are high in mercury, like shark, swordfish, belen mackerel, tilefish, and albacore tuna  Things to  limit  Ask your healthcare provider whether it’s safe to eat or drink:  Caffeine  Artificial sweeteners  Organ meats  Certain types of fish  Fish and shellfish that contain mercury in lower amounts, like shrimp, canned light tuna, salmon, pollock, and catfish  Sam last reviewed this educational content on 2018 [employer]. [address] [place] [address], [place] [address]. All rights reserved. This information is not intended as a substitute for professional medical care. Always follow your healthcare professional's instructions.        Nutrition During Pregnancy    Having a healthy baby depends mostly on you. What you eat matters to your baby and your health. During pregnancy, you will likely need about 300 more calories per day than before you became pregnant. Each day, try to eat the number of servings listed here for each food group. In addition, cut down on salt and caffeine. Limit the amount of sweets and high-fat foods you eat. Don’t smoke or drink alcohol.  Important: See your healthcare provider as often as requested. If you have any questions, be sure to ask them.  Fruits  2 cups  Examples of 1-cup servings:  1 medium apple  1 medium orange  1 medium banana  1 cup chopped fruit  1 cup 100% fruit juice (pasteurized)  1/2 cup dried fruit Vegetables  2-1/2 to 3 cups   Examples of 1 servin cups raw, leafy greens  1 cup raw or cooked cut-up vegetables  1 cup 100% vegetable juice (pasteurized) Grains & Cereals*  6 to 8 ounces  Examples of 1-ounce servings:  1 slice bread  1/2 cup cooked rice  1/2 cup cooked cereal  1/2 cup pasta  1 ounce cold cereal Fats & Oils  6 to 8 teaspoons   Dairy**  3 cups  Examples of 1-cup servings:  1 cup milk  1 cup yogurt  1-1/2 ounces natural cheese  2 ounces processed cheese Protein---  5 to 6-1/2 ounces  Examples of 1-ounce servings:  1 egg  1 ounce of lean meat, poultry, or fish  1/4 cup cooked beans  1 tablespoon peanut butter  1/2 ounce nuts  Fluids  8 or more 8-ounce glasses  Examples:  Water  Diluted juices: Apple, orange, cranberry  Mineral water  Clear soups, broth     *Note: Choose whole grains whenever possible.  ** Note: Try to choose low-fat options; avoid soft cheeses and unpasteurized milk.  --- Notes: Avoid raw or undercooked meats, eggs, and seafood. fish, and shellfish. Also, some types of fish, like shark, swordfish, and belen mackerel should not be eaten during pregnancy. Avoid hot dogs, luncheon meats, and cold cuts unless heated to steaming just before being served. Ask your healthcare provider about safe choices.     Prenatal supplements  A prenatal supplement is a pill that you take daily during pregnancy. It helps make sure you’re getting the right amount of certain nutrients that are important to your baby. Ask your healthcare provider to help you choose the best one for you. Important nutrients during pregnancy include:  Folic acid. It's best to start taking this supplement 1 month before you start trying to get pregnant. Folic acid helps prevent certain problems in your baby. During pregnancy, you need to take 400 micrograms (mcg) of folic acid every day for the first 2 to 3 months after conception, and then 600 mcg is needed for growing fetus and placenta.  Iron, calcium, and vitamin D. You may also be advised to take these supplements during pregnancy. They help keep you and your baby healthy. Be sure to take them at different times because calcium makes it hard for the body to absorb iron. Taking iron with orange juice helps to increase its absorption.   [employer] last reviewed this educational content on 12/1/2017 © 2000-2020 The [employer] [facility]. [address] [place] [address], [place] [address]. All rights reserved. This information is not intended as a substitute for professional medical care. Always follow your healthcare professional's instructions.        Pregnancy: Planning Your Exercise Routine    While you’re pregnant, an  exercise routine helps both your mind and your body feel good. It tones your muscles and makes them stronger. It also gives you and your baby more oxygen.  The right exercise for you  Overall conditioning is best for you and your baby. Try walking, swimming, or riding a stationary bike. Always warm up, cool down, and drink enough fluids. Keep a snack close by in case your blood sugar gets low. Discuss exercise choices with your healthcare provider. Talk about the following:  If you already exercise, find out how to adapt your routine while you’re pregnant. Keep the intensity of the exercise moderate.  Ask if there are any local prenatal exercise classes, like yoga or water aerobics. Find out which prenatal exercise videos are good choices.  If you were not exercising before your pregnancy, find out the best way to start. Now is not the time to begin a new workout on your own. Start slowly. Listen to your body.  Ask which forms of exercise you should avoid. These may include risky activities like hot yoga, horseback riding, scuba diving, skiing, skating, and contact sports.  Pelvic tilts  These help strengthen your stomach muscles and low back. You can do pelvic tilts instead of sit-ups.  Do this exercise on your hands and knees.  Relax the back of your neck. Pull your stomach in until your low back flattens.  Hold for 30 seconds. Release. Repeat 10 times. Do this twice a day.  Kegel exercises  Kegel exercises strengthen the pelvic muscles. Doing Kegels daily helps prepare these muscles for delivery. Kegels also help ease your recovery. You exercise these muscles by tightening, holding, then relaxing them. To do 1 type of Kegel exercise, contract as if you were stopping your urine stream (but do it when you’re not urinating). Hold for 10 seconds, then repeat 10 times, a few times a day.  Tips to add activity  Here are some tips to follow:  Park the car farther from a store and walk.  If you can, do errands on foot  instead of driving.  Walk across the office to talk to someone in person instead of calling.  While waiting for appointments, go up and down stairs or around the block.   Tips to stay active  Here are some tips to follow:  Maintain your routine. But exercise less intensely if you feel tired.  Base your workout on how you feel, not your heart rate. Heart rates aren’t a good way to measure effort during pregnancy.  Avoid exercising on your back after week 16.   What are the warning signs that I should stop exercising?  Stop exercising and call your healthcare provider if you have any of these symptoms:  Vaginal bleeding   Dizziness or feeling faint   Increased shortness of breath   Chest pain   Headache   Muscle weakness   Calf (back of the leg) pain or swelling    Uterine contractions or pre-term labor   Decreased fetal movement   Fluid leaking (or gushing) from your vagina  [employer] last reviewed this educational content on 1/1/2018  © 9568-1204 [employer]. [address] [place] [address]. All rights reserved. This information is not intended as a substitute for professional medical care. Always follow your healthcare professional's instructions.        Exercise During Pregnancy  Regular exercise can help you adapt to the changes your body is going through during pregnancy. Exercising may help you relax, and it gets you ready for labor and delivery. Talk to your healthcare provider about the kinds of activities you can do. Then go ahead and enjoy them.    Get started  Even if you didn’t exercise before pregnancy, it is not too late to start. Choose an activity that you like and that fits your lifestyle. Begin slowly and build up a little at a time. Be sure to check with your healthcare provider before starting any exercise program. The following tips may help you get started:  Choose a time and place to exercise each day.  Wear loose-fitting clothes and comfortable athletic shoes.  Stretch  before and after you exercise. (Be sure to stretch slowly and to hold stretches for 30 to 40 seconds.)  Be active  Unless your healthcare provider says otherwise, try to exercise for 30 minutes or more most days of the week:  Overall conditioning, like swimming, bicycling, or walking, is especially beneficial.  Aerobics and exercises that increase your pulse rate help condition your body and strengthen your heart. Ask about special prenatal aerobics classes.  Exercise safely  These tips will help you have a safe, healthy workout:  Stay cool. Stop exercising if you feel overheated.  Slow down if you’re out of breath. If you can’t talk during exercise, lower the intensity of the workout.  Monitor the intensity of your workout. Only do moderate-intensity (not strenuous) exercise.  Stay off your back. Lying on your back can decrease blood flow to your baby.  Drink water before, during and after your workout.  Eat 300 extra calories a day. A light snack before and after you exercise will help keep your energy up.  Avoid activities requiring balancing skills later in pregnancy.  Do Kegel exercises  Kegel exercises strengthen the pelvic floor muscles used in childbirth. These muscles are the same ones used to stop the flow of urine. Do Kegel exercises daily:  Squeeze your pelvic floor muscles for a count of 3.  Relax, then squeeze again.  Repeat 10 to 15 times in a row at least 3 times a day.  You can do Kegel exercises anytime and anywhere.  Keep walking  No matter what other exercise you do, try to walk whenever you can:  If you’re working all day, take a lunchtime walk in the park with a friend.  When you shop, park away from the store entrance and walk the extra distance.  Take the stairs instead of the elevator.     When to stop exercising and call your healthcare provider  Call your healthcare provider right away if you have:  Shortness of breath before starting exercise  Vaginal bleeding  Dizziness or feeling  faint  Chest pain  Headache  Decreased fetal movement   contractions   Muscle weakness  Calf pain or swelling  Fluid leaking from the vagina   [employer] last reviewed this educational content on 2017 The [employer]. [address] [place] [address], [place] [address]. All rights reserved. This information is not intended as a substitute for professional medical care. Always follow your healthcare professional's instructions.        Pregnancy: Your Weight     Your weight will be checked regularly by your healthcare provider.   Being a healthy weight is important for both you and your baby. The weight you gain now is not just extra fat. It is also the weight of your baby. And it is the increased blood and fluids to support the baby. A slow, steady rate of gain is best. How much you should gain depends on your weight before getting pregnant. Check with your healthcare provider to find out what is right for you.  Talk to your healthcare provider if you have any questions.   If you gain too much  Gaining too much weight might cause you to feel tired or you could have a harder pregnancy or birth. If you and your healthcare provider decide you’re gaining too much:  Eat fewer fats and sugars. Instead, eat fruit, vegetables, and whole-grain foods.  Drink plenty of water between meals.  Get at least 20 minutes of light exercise, like walking, each day.  Don’t diet. You might not get enough of the nutrients you or your baby needs.  Keep a diet diary to help you gauge what and how much you are eating.  If you’re not gaining enough  If you don’t gain enough, your baby could be too small or have health problems. Women tend to gain most of their weight in the second and third trimesters. For now:  Eat many types of foods. Make sure you get enough calcium, protein, and carbohydrates.  Don’t skip meals.  Eat healthy snacks.  Pick nutrient-dense, high-calorie healthy food like trail mix or protein  tyrone.  See a dietitian for help.  Talk to your healthcare provider if you have had an eating disorder or problems with certain foods.  The following are ways to get more calories:  Eat breakfast every day. Peanut butter or a slice of cheese on toast can give you an extra protein boost.  Snack between meals. Yogurt and dried fruits can provide protein, calcium, and minerals.  Try to eat more foods that are high in good fats, like nuts, fatty fish, avocados, and olive oil.  Drink juices made from real fruit that are high in vitamin C or beta carotene, like grapefruit juice, orange juice, papaya nectar, apricot nectar, and carrot juice.  Avoid junk food, like foods high in sugar.  [employer] last reviewed this educational content on 1/1/2018 © 2000-2020 The [employer]. [address] [place] [address]. All rights reserved. This information is not intended as a substitute for professional medical care. Always follow your healthcare professional's instructions.        Dental Care for Pregnant Women  Pregnancy is a time when your oral health needs more attention. Hormone changes during pregnancy can cause certain problems with teeth and gums, and make treatment more complicated.  How pregnancy affects oral health  During pregnancy, hormone changes can cause swollen, bleeding, and irritated gums (gingivitis). Your gums may be very sore, and brushing and flossing may cause discomfort. If left untreated, gingivitis can lead to a more serious gum disease called periodontitis. Severe periodontitis can lead to tooth loss.  Some pregnant women also have small bright-red growths on their gums that bleed easily. These are often called “pregnancy tumors.” They are not cancer. They usually go away right after birth. Talk with your dentist or healthcare provider if you have concerns.  Keeping a healthy mouth  Brush twice daily with fluoride toothpaste. Floss at least once a day.  If you have morning sickness,  rinse your mouth with a teaspoon of baking soda mixed with water after vomiting. Do not brush your teeth right after vomiting. This can remove tooth enamel.  See your dentist for cleanings and checkups more often if needed. This is especially true in your second and third trimesters.  Ask your dentist or healthcare provider if you should use a special mouth rinse to help prevent gingivitis.  Tell your dentist or healthcare provider about any changes in your mouth, such as soreness or bleeding.  Safety concerns  Make sure to tell your dentist that you’re pregnant. He or she can help you stay safe. If you need to have dental X-rays during pregnancy, he or she will make sure you are fully protected. You will wear a lead apron over your belly during the X-ray process. The apron helps block radiation from the X-rays.  If you need to take medicines like antibiotics or pain relievers for dental problems, talk with your healthcare providers first. Your providers will discuss the risks and benefits of taking these during pregnancy.  If you have a high-risk pregnancy, your dentist and your healthcare provider may advise that certain dental treatments wait until after you give birth.  [employer] last reviewed this educational content on 12/1/2017 © 2000-2020 The [employer]. [address] [place] [address], [place] [address]. All rights reserved. This information is not intended as a substitute for professional medical care. Always follow your healthcare professional's instructions.       Pregnancy: Your First Trimester Changes  The first trimester is a time of rapid development for your baby. Because your baby is growing so quickly, it is important that you start a healthy lifestyle right away. By the end of the first trimester, your baby has formed all of its major body organs and weighs just over an ounce.     Actual size of baby is 1/4\"    Month 1 (weeks 1 to 4)  The placenta (the organ that nourishes your baby) begins to  form. The brain, spinal cord, heart, gastrointestinal tract, and lungs begin to develop. Your baby is about 1/4-inch long by the end of the first month.     Actual size of baby is 1\"      Month 2 (weeks 5 to 8)  All of your baby’s major body organs form. The face, fingers, toes, ears, and eyes appear. By the end of the month, your baby is about 1-inch long.     Actual size of baby is 3\"      Month 3 (weeks 9 to 12)  Your baby can open and close its fists and mouth. The sexual organs begin to form. As the first trimester ends, your baby is about 3-inches long.  [employer] last reviewed this educational content on 10/1/2017  © 2726-0481 The [employer]. [address] [place] [address], [place] [address]. All rights reserved. This information is not intended as a substitute for professional medical care. Always follow your healthcare professional's instructions.        Adapting to Pregnancy: First Trimester    As your body adjusts, you may have to change or limit your daily activities. You’ll need more rest. You may also need to use the energy you have more wisely.  Your changing body  Almost every part of your body is affected as you adapt to pregnancy. The uterus and cervix will begin to soften right away. You may not look very pregnant during the first 3 months. But you are likely to have some common signs of early pregnancy:  Nausea  Fatigue  Frequent urination  Mood swings  Bloating of the belly  Constipation  Heartburn  Missed or light periods (first trimester bleeding)  Nipple or breast tenderness and breast swelling  It’s not too late to start good habits  What matters most is protecting your baby from this moment on. If you smoke, drink alcohol, or use drugs, now is the time to stop. If you need help, talk with your healthcare provider:  Smoking increases the risk of stillbirth or having a low-birth-weight baby. If you smoke, quit now.  Alcohol and drugs have been linked with miscarriage, birth defects,  intellectual disability, and low birth weight. Do not drink alcohol or take drugs.  Tips to relieve nausea  Although nausea can happen at any time of the day, it may be worse in the morning. To help prevent nausea:  Eat small, light meals at frequent intervals.  Drink fluids often.  Get up slowly. Eat a few unsalted crackers before you get out of bed.  Avoid smells that bother you.  Avoid spicy and fatty foods.  Eat an ice pop in your favorite flavor.  Get plenty of rest.  Ask your healthcare provider about taking maninder or vitamin B6 for nausea and vomiting.  Talk with your healthcare provider if you take vitamins that upset your stomach.  Work concerns  The end of the first trimester is a good time to discuss working during pregnancy with your employer. Follow your healthcare provider’s advice if your job needs you to stand for a long time, work with hazardous tools, or even sit at a desk all day. Your workspace, workload, or scheduled hours may need to be adjusted. Perhaps you can change body postures more often or take an extra break.  Advice for travel  Talk to your healthcare provider first, but the second trimester may be the best time for any travel. You may be advised to avoid certain trips while you’re pregnant. Food and water can be concerns in developing countries. Travel by car is a good choice, as you can stop, get out, and stretch. Bring snacks and water along. Fasten the lap belt below your belly, low over your hips. Also be sure to wear the shoulder harness.  Intimacy  Unless your healthcare provider tells you to, there is no reason to stop having sex while you’re pregnant. You or your partner may notice changes in desire. Desire may be less in the first trimester, due to nausea and fatigue. In the second trimester, sex may be very enjoyable. The third trimester can be a challenge comfort-wise. Try different positions and see what’s best for you both.  [employer] last reviewed this educational content  on 10/1/2017  © 9416-0170 [employer]. [address] [place] [address], [place] [address]. All rights reserved. This information is not intended as a substitute for professional medical care. Always follow your healthcare professional's instructions.        Comfort Tips During Pregnancy    Talk with your healthcare provider before using pain-relieving medicine at any time during your pregnancy.  First trimester tips  Nausea  Get up slowly. Eat a few unsalted crackers before you get out of bed.  Avoid smells that bother you.  Eat small bland low fat, light high-protein meals at frequent intervals.  Sip on water, weak tea, or clear soft drinks, like ginger ale. Eat ice chips.  Fatigue  Take catnaps when you can.  Get regular exercise.  Accept help from others.  Practice good sleep habits, like going to bed and getting up at the same time each day. Use your bed only for sleep and sex.  Mood swings  Talk about your feelings with others, including other mothers.  Limit sugar, chocolate, and caffeine.  Eat a healthy diet. Don’t skip meals.  Get regular exercise.  Headaches  Get fresh air and exercise.  Relax and get enough rest.  Check with your healthcare provider before taking any pain medicines.  Second trimester tips  Here are some suggestions to help you cope:  To limit ankle swelling, sit with your feet raised or wear support hose.  If you have pain in your groin and stomach (round ligament pain), avoid sudden twisting movements.  For leg cramps, flexing your foot often brings immediate relief. You also may try massaging your calf in long, downward strokes, or stretching your legs before going to bed. Get enough exercise and wear shoes with flexible soles.  Third trimester tips  Reducing heartburn  Eat small, light meals throughout the day rather than 3 large ones.  Sleep with your upper body raised 6 inches. Don’t lie down until 2 hours after you eat.  Don't eat greasy, fried, or spicy foods.  Avoid citrus  fruits and juices.  Treating constipation  Eat foods high in fiber (whole-grain foods, fresh fruit and vegetables).  Drink plenty of water.  Get regular exercise.  Discuss other medicines (like docusate and psyllium) with your healthcare provider.  Taking care of your breasts  Avoid using harsh soaps or alcohol, which can cause excessive dryness.  Wear nursing bras. They provide more support than regular bras and can be used after pregnancy if you breastfeed.  Getting a good night’s sleep  Take a warm shower before bed.  Sleep on a firm mattress.  Lie on your side with 1 leg crossed over the other.  Use pillows to support arms, legs, and belly.  [email or web address] last reviewed this educational content on 10/1/2017  © 7653-5495 [employer]. [address] [place] [address], [place] [address]. All rights reserved. This information is not intended as a substitute for professional medical care. Always follow your healthcare professional's instructions.        Bleeding During Early Pregnancy     Ultrasound can help check the health of your fetus.     If you’ve had bleeding early in your pregnancy, you’re not alone. Many other pregnant women have had early bleeding, too. And in most cases, nothing is wrong. But your healthcare provider still needs to know about it. He or she may want to do tests to find out why you’re bleeding. Call your healthcare provider if you notice bleeding during pregnancy.  What causes early bleeding?  The cause of bleeding early in pregnancy is often unknown. But many factors early on in pregnancy may lead to bleeding or spotting. These include sexual intercourse, which may cause bleeding in any trimester. Here are some other causes:  Implantation of the embryo on the uterine wall  Subchorionic hemorrhage (bleeding between the sac membrane and the uterus)  Miscarriage  Ectopic (tubal) pregnancy  If you notice spotting  Spotting (very light bleeding) is the most common type of bleeding in early  pregnancy. If you notice it, call your healthcare provider. Chances are, he or she will tell you that you can care for yourself at home.  If tests are needed  Depending on how much you bleed, your healthcare provider may ask you to come in for some tests. A pelvic exam, for instance, can help see how far along your pregnancy is. You also may have an ultrasound or a Doppler test. These imaging tests use sound waves to check the health of your fetus. The ultrasound may be done on your belly or inside your vagina. Your healthcare provider also may order a special blood test. This test compares your hormone levels in blood samples taken 2 days apart. The results can help your healthcare provider learn more about the implantation of the embryo. Your blood type will also need to be checked to evaluate whether you will need to be treated for Rh sensitization.   Warning signs  If your bleeding doesn’t stop or if you notice any of the following, seek medical help right away:  Soaking a sanitary pad each hour  Bleeding like you’re having a period  Cramping or severe belly pain  Feeling dizzy or faint  Tissue passing through your vagina  Bleeding at any time after the first trimester  Questions you may be asked  Though not normal, bleeding early in pregnancy is common. If you’ve noticed any bleeding, you may be concerned. But keep in mind that bleeding alone doesn’t mean something is wrong. Call your healthcare provider right away, though. He or she may ask you questions like these to help find the cause of your bleeding:  When did your bleeding start?  Is your bleeding very light (spotting) or is it like a period?  Is the blood bright red or brownish?  Have you had sexual intercourse recently?  Have you had pain or cramping?  Have you felt dizzy or faint?  Monitoring your pregnancy  Bleeding will often stop as quickly as it began. Your pregnancy may go on a normal path again. You may need to make a few extra prenatal visits.  But you and your baby will most likely be fine.  [employer] last reviewed this educational content on 6/1/2016  © 1518-6559 [employer]. [address] [place] [address], [place] [address]. All rights reserved. This information is not intended as a substitute for professional medical care. Always follow your healthcare professional's instructions.        Abdominal Pain and Early Pregnancy    The tests you had show that you are pregnant, but the exact cause of your pain isn’t clear.   Some pain and bleeding are common early in pregnancy. Often they stop, and you can go on to have a normal pregnancy and baby. Other times the pain or bleeding can be signs of a miscarriage or ectopic pregnancy. An ectopic pregnancy is a very serious problem. At this time it is unclear if your pregnancy will continue normally, if you will have a miscarriage, or if you could have an ectopic pregnancy. Below is some information about this.   Miscarriage  At this time we don’t know whether you will have a miscarriage, or if things will clear up and your pregnancy will continue normally. We understand that this is emotionally difficult. There is little we can say to change the way you feel. But understand that miscarriages are common.   About 1 or 2 out of every 10 pregnancies end this way. Some end even before you know you are pregnant. This happens for a number of reasons, and usually we never figure out why. It’s important you know that it is not your fault. It didn’t happen because you did anything wrong.   Having sex or exercising does not cause a miscarriage. These activities are usually safe unless you have pain or bleeding or your healthcare provider tells you to stop. Even minor falls won’t cause a miscarriage. Miscarriages happen because things were not developing as they were supposed to. No medicine can prevent a miscarriage.   Ectopic pregnancy  In a normal pregnancy, the fertilized egg attaches to the wall of the womb  (uterus). In an ectopic or tubal pregnancy, the fertilized egg attaches outside the uterus, usually in the fallopian tube. Very rarely, the egg attaches to an ovary or somewhere else in the abdomen. An ectopic pregnancy is much less common than a miscarriage, but it is very serious. The baby can't survive, and as it grows it can rupture the tube. This can cause internal bleeding and even death. Risk factors for an ectopic are:   An ectopic pregnancy in the past  Pelvic inflammatory disease (PID)  Endometriosis  Smoking  An IUD  Additional tests  Because we don’t know what’s causing your symptoms, you will need more tests to figure out what the problem is. You may need the following.   Ultrasound  An ultrasound can usually find a normal pregnancy as early as 4 to 5 weeks along. If the ultrasound does not show the baby inside the uterus, it means one of the following.   You have a normal pregnancy less than 4 weeks along  You are having or recently had a miscarriage  You have an ectopic pregnancy  Quantitative HCG  This test measures the amount of a pregnancy hormone in your blood. Comparing today's test result to a repeat test in 2 days will show whether you have a normal pregnancy.   Laparoscopy  This is a type of surgery. The healthcare provider will put a tube with a light inside your belly (abdomen) to look directly at your pelvic organs. This test is used when it is not safe to wait 2 days for blood test results.   Important information  If you do have an ectopic pregnancy, there is a small chance that the growing fetus can tear the fallopian tube. This can cause severe internal bleeding. If this happens, you may have:   Sudden severe pain in your lower abdomen  Vaginal bleeding  Weakness, dizziness, and sometimes fainting  If any of these symptoms occur:  Call 911or return right away to the hospital.  Don't drive yourself.  Don't go to your healthcare provider's office or to a clinic. Go to the hospital.  Home  care  Follow these guidelines to help care for yourself at home:   Rest until your next exam. Don’t do anything strenuous.  Eat a light diet with foods that are easy to digest.  Don’t have sex until your healthcare provider says it’s OK.  Follow-up care  Follow up with your healthcare provider, or as advised. If you were told to have a repeat blood test in 2 days, it’s important to get it done.   If you had an X-ray or ultrasound, a radiologist will review it. You will be told of any new findings that may affect your care.   Call 911  Call 911 if you have any of these:   Severe pain and very heavy bleeding  Severe lightheadedness, passing out, or fainting  Rapid heart rate  Trouble breathing  Confused or difficulty waking up  When to seek medical advice  Call your healthcare provider right away if any of these occur:   The pain in your abdomen gets worse, either suddenly or gradually.  You are dizzy or weak when you stand.  You have heavy vaginal bleeding. This means soaking 1 pad an hour for 3 hours.  You have vaginal bleeding for more than 5 days.  You have repeated vomiting or diarrhea.  The pain in your abdomen moves to the lower right.  You have blood in your vomit or bowel movements. This will be dark red or black.  You have a fever of 100.4ºF (38ºC) or higher, or as directed by your healthcare provider.  [employer] last reviewed this educational content on 9/1/2019 © 2000-2020 [employer]. [address] [place] [address], [place] [address]. All rights reserved. This information is not intended as a substitute for professional medical care. Always follow your healthcare professional's instructions.

## 2024-10-25 ENCOUNTER — NURSE ONLY (OUTPATIENT)
Dept: OBGYN CLINIC | Facility: CLINIC | Age: 34
End: 2024-10-25

## 2024-10-25 DIAGNOSIS — Z34.81 ENCOUNTER FOR SUPERVISION OF OTHER NORMAL PREGNANCY IN FIRST TRIMESTER (HCC): Primary | ICD-10-CM

## 2024-10-25 RX ORDER — FOLIC ACID 0.8 MG
800 TABLET ORAL DAILY
COMMUNITY

## 2024-10-25 NOTE — PROGRESS NOTES
Pt verified name and .    Pt called today for RN OB Education.   OB History    Para Term  AB Living   3 2 2 0 0 2   SAB IAB Ectopic Multiple Live Births   0 0 0 0 2       How did you learn of midwives: Last delivery with midwives    Labor preferences: Prefers epidural    Following a special diet:  No pork, halal diet    LMP: 24    Pre  BMI: 30.06     EPDS score: 0/30    Working VICKEY: 25  Hx of genetic abnormality in family: Daughter with Down Syndrome.  Hx of varicella: Pt unsure, titer ordered.      Consent (if needed): Pt to discuss with midwife at new OB visit.     Sterilization/Contraception: Declines    OUD Screening: Pt. Has answered NO 5P questions and has NO  risk factors.    Pt. Given What pregnant women need to know handout.    SDOH Screening: Low risk    Educational material reviewed with patient: Prenatal care, nutrition, weight gain recommendations, travel, exercise, intercourse, pregnancy changes, safe medications, pregnancy and work, fetal movement, labor and  labor, warning signs, food safety, tdap, cord blood, breastfeeding, circumcision, and Group B strep.   Preferred feeding method - Breastfeed  Circ - yes    Blood transfusion if needed: Consents    PN labs: Ordered      Optional genetic screening labs were reviewed: Cell FreeDNA, FTS with US, Quad screen MSAFP and CF screening.   MFM referral previously placed at missed menses visit.     [facility] Media Policy: Discussed. Pt verbalized understanding and agreed.      NOB apt:   MES

## 2024-11-04 ENCOUNTER — HOSPITAL ENCOUNTER (OUTPATIENT)
Dept: PERINATAL CARE | Facility: HOSPITAL | Age: 34
Discharge: HOME | End: 2024-11-04
Attending: OBSTETRICS & GYNECOLOGY
Payer: MEDICAID

## 2024-11-04 ENCOUNTER — HOSPITAL ENCOUNTER (OUTPATIENT)
Dept: PERINATAL CARE | Facility: HOSPITAL | Age: 34
End: 2024-11-04
Attending: ADVANCED PRACTICE MIDWIFE
Payer: MEDICAID

## 2024-11-04 VITALS
BODY MASS INDEX: 32 KG/M2 | HEART RATE: 76 BPM | SYSTOLIC BLOOD PRESSURE: 124 MMHG | WEIGHT: 205 LBS | DIASTOLIC BLOOD PRESSURE: 85 MMHG

## 2024-11-04 DIAGNOSIS — O99.212 OTHER OBESITY DUE TO EXCESS CALORIES AFFECTING PREGNANCY IN SECOND TRIMESTER (HCC): ICD-10-CM

## 2024-11-04 DIAGNOSIS — O09.299: ICD-10-CM

## 2024-11-04 DIAGNOSIS — Z36.9 FIRST TRIMESTER SCREENING (HCC): ICD-10-CM

## 2024-11-04 DIAGNOSIS — O99.210 OBESITY IN PREGNANCY (HCC): ICD-10-CM

## 2024-11-04 DIAGNOSIS — E66.09 OTHER OBESITY DUE TO EXCESS CALORIES AFFECTING PREGNANCY IN SECOND TRIMESTER (HCC): ICD-10-CM

## 2024-11-04 DIAGNOSIS — Z36.9 FIRST TRIMESTER SCREENING (HCC): Primary | ICD-10-CM

## 2024-11-04 PROCEDURE — 36415 COLL VENOUS BLD VENIPUNCTURE: CPT

## 2024-11-04 PROCEDURE — 76801 OB US < 14 WKS SINGLE FETUS: CPT | Performed by: OBSTETRICS & GYNECOLOGY

## 2024-11-04 NOTE — PROGRESS NOTES
Outpatient Maternal-Fetal Medicine Consultation    Dear Ms. Cardona    Thank you for requesting ultrasound evaluation and maternal fetal medicine consultation on your patient Melia Mendoza.  As you are aware she is a 33 year old female  with a jackson pregnancy and an Estimated Date of Delivery: 25.  A maternal-fetal medicine consultation was requested secondary to prior child with trisomy 21.  Her prenatal records and labs were reviewed.    She passed this pregnancy.  She did not have any genetic screening last time.  Her daughter was born with Down syndrome.  She states it is a full extra chromosome 21.  ROS    HISTORY  OB History    Para Term  AB Living   3 2 2 0 0 2   SAB IAB Ectopic Multiple Live Births   0 0 0 0 2      # Outcome Date GA Lbr Terrell/2nd Weight Sex Type Anes PTL Lv   3 Current            2 Term 23 40w0d 09:09  00:54 8 lb 3.2 oz (3.72 kg) F VAGINAL SHELLEY EPI N GHISLAINE   1 Term 10/30/20 41w1d  9 lb 8.7 oz (4.33 kg) M CS-LTranv EPI  GHISLAINE      Complications: Failure to Progress in Second Stage       Allergies:  Allergies[1]   Current Meds:  Current Outpatient Medications   Medication Sig Dispense Refill    folic acid 800 MCG Oral Tab Take 1 tablet (800 mcg total) by mouth daily.      docusate sodium 100 MG Oral Tab Take 1 tablet (100 mg total) by mouth 2 (two) times daily. 60 tablet 3    Prenatal Vit-Fe Fumarate-FA (PRENATAL VITAMIN) 27-0.8 MG Oral Tab Take 1 tablet by mouth daily. 90 tablet 3        HISTORY:  Past Medical History:    Cold sore          Past Surgical History:   Procedure Laterality Date              Tonsillectomy      as a child      No family history on file.   Social History     Socioeconomic History    Marital status:     Number of children: 1    Years of education: 18    Highest education level: Master's degree (e.g., MA, MS, [place]al[place], MEd, MSW, DANGELO)   Tobacco Use    Smoking status: Never     Passive exposure: Never    Smokeless tobacco:  Never   Vaping Use    Vaping status: Never Used   Substance and Sexual Activity    Alcohol use: Never    Drug use: Never    Sexual activity: Yes     Partners: Male   Other Topics Concern    Blood Transfusions Yes    Caffeine Concern Yes    Special Diet No    Exercise No    Seat Belt Yes     Social Drivers of Health     Financial Resource Strain: Low Risk  (10/25/2024)    Financial Resource Strain     Difficulty of Paying Living Expenses: Not hard at all     Med Affordability: No   Food Insecurity: No Food Insecurity (10/25/2024)    Food Insecurity     Food Insecurity: Never true   Transportation Needs: No Transportation Needs (10/25/2024)    Transportation Needs     Lack of Transportation: No   Stress: No Stress Concern Present (10/25/2024)    Stress     Feeling of Stress : No   Housing Stability: Low Risk  (10/25/2024)    Housing Stability     Housing Instability: No          PHYSICAL EXAMINATION:  /85   Pulse 76   Wt 205 lb (93 kg)   LMP 08/11/2024 (Exact Date)   BMI 32.11 kg/m²   Physical Exam  Constitutional:       Appearance: Normal appearance.   Abdominal:      Palpations: Abdomen is soft.      Tenderness: There is no abdominal tenderness.   Neurological:      Mental Status: She is alert.   Psychiatric:         Mood and Affect: Mood normal.         Behavior: Behavior normal.               OBSTETRIC ULTRASOUND  The patient had a first trimester ultrasound today which revealed normal first trimester anatomy with size consistent with dates.  The NT measurement was: 1.5 mm; this is within normal limits.  The nasal bone is present.  Single IUP with cardiac activity 155 bpm  Amniotic fluid is normal.  The CRL is consistent with gestational age. Nasal bone present. The nuchal translucency measures 1.5 mm. This is within normal limits.   The maternal uterus and ovaries appear normal.  See PACS/Imaging Tab For Complete Ultrasound Report  I interpreted the results and reviewed them with the  patient.    DISCUSSION  During her visit we discussed and reviewed the following issues:      When there has been a pergnancy affected by triomy 21, there is an increased risk of trisomy 21 until they are approximately 40.  That risk is 1% with each pregnancy until age 40 when it becomes between 1 to 2%.  We discussed that 75% of babies with Down syndrome rolling normal on ultrasound.  Her daughter is receiving early childhood intervention    .OBESITY:  Her BMI prior to pregnancy was 33  Obesity during pregnancy is associated with numerous maternal and  risks.  It is not clear whether obesity is a direct cause of adverse pregnancy outcome or whether the association between obesity and adverse pregnancy outcome is due to factors such as diabetes mellitus.   Data suggest that obese women should be encouraged to undertake a weight reduction program (diet, exercise, behavior modification, and possibly bariatric surgery in some cases) prior to attempting to conceive.            Subfertility in obese women is most commonly related to ovulatory dysfunction, and, in some obese women, the ovulatory dysfunction is related to polycystic ovary syndrome (PCOS). It is also important to note that even among ovulatory women, increasing obesity is associated with decreasing spontaneous pregnancy rates.  The increased risk of miscarriage in obese women may be because such women often have PCOS or isolated insulin resistance.                 Due to its strong association with obesity in the general population, type 2 diabetes mellitus is one of the two most common medical complications of the obese . The increased risk of type 2 diabetes is primarily related to an exaggerated increase in insulin resistance in the obese state. It is reasonable to screen obese gravidas for undiagnosed pregestational diabetes in the first trimester.   Glucose intolerance associated with gestational diabetes generally resolves postpartum;  however, obese women with a history of gestational diabetes have a two-fold increased prevalence of subsequent type 2 diabetes.           An association between obesity and hypertensive disorders during pregnancy has been consistently reported.  In particular, maternal weight and BMI are independent risk factors for preeclampsia.             Studies have found that the increased risk of  birth in obese gravidas is primarily associated with obesity-related medical and  complications, rather than an intrinsic predisposition to spontaneous  birth. Prevention of  birth in these patients, therefore, should be directed toward prevention or management of medical and obstetrical complications.               Both prepregnancy obesity and excessive maternal weight gain before or during pregnancy contribute to an increased probability of  delivery.  It has also been hypothesized that obesity may lead to dystocia due to increased soft tissue deposition in the maternal pelvis.    delivery in the obese  is associated with numerous perioperative concerns, including emergency delivery, prolonged incision to delivery interval, blood loss >1000 mL, longer operative times, wound infection, thromboembolism, and endometritis.            Maternal obesity appears to be associated with a small increase in the absolute rate of some congenital anomalies (primarily neural tube defect and cardiac), and the risk may increase with increasing maternal weight.  Level II ultrasound is advised for women with obesity.  The risk of neural tube defects increased significantly with maternal weight.    The analysis found that overweight and obese pregnant women experienced significantly more stillbirths than normal weight women.  Third trimester  testing is advised.         IMPRESSION:  IUP at 12w1d  Daughter with trisomy 21  Obesity    RECOMMENDATIONS:  Continue care with Ms. Cardona  Early 1  hr gtt  Level II ultrasound at 20 weeks  Growth US & BPP at 32 weeks  Weekly NSTs at 36 weeks  11-20 lb weight gain for pregnancy       Thank you for allowing me to participate in the care of your patient.  Please do not hesitate to contact me if additional questions or concerns arise.      Lorraine Moya M.D.    40 minutes spent in review of records, patient consultation, documentation and coordination of care.  The relevant clinical matter(s) are summarized above.     Note to patient and family  The 21st Century Cures Act makes medical notes available to patients in the interest of transparency.  However, please be advised that this is a medical document.  It is intended as fcys-xn-hedt communication.  It is written and medical language may contain abbreviations or verbiage that are technical and unfamiliar.  It may appear blunt or direct.  Medical documents are intended to carry relevant information, facts as evident, and the clinical opinion of the practitioner.         [1]   Allergies  Allergen Reactions    Ibuprofen NAUSEA AND VOMITING    Pork Derived Products OTHER (SEE COMMENTS)     Jain

## 2024-11-04 NOTE — ADDENDUM NOTE
Encounter addended by: Lorraine Moya MD on: 11/4/2024 4:16 PM   Actions taken: SmartForm saved, Clinical Note Signed

## 2024-11-08 ENCOUNTER — INITIAL PRENATAL (OUTPATIENT)
Dept: OBGYN CLINIC | Facility: CLINIC | Age: 34
End: 2024-11-08

## 2024-11-08 ENCOUNTER — LAB ENCOUNTER (OUTPATIENT)
Dept: LAB | Facility: HOSPITAL | Age: 34
End: 2024-11-08
Attending: ADVANCED PRACTICE MIDWIFE
Payer: MEDICAID

## 2024-11-08 VITALS
WEIGHT: 208 LBS | HEART RATE: 81 BPM | SYSTOLIC BLOOD PRESSURE: 105 MMHG | BODY MASS INDEX: 33 KG/M2 | DIASTOLIC BLOOD PRESSURE: 71 MMHG

## 2024-11-08 DIAGNOSIS — Z11.3 SCREENING FOR STD (SEXUALLY TRANSMITTED DISEASE): ICD-10-CM

## 2024-11-08 DIAGNOSIS — O99.891 BACK PAIN AFFECTING PREGNANCY, ANTEPARTUM (HCC): ICD-10-CM

## 2024-11-08 DIAGNOSIS — M54.9 BACK PAIN AFFECTING PREGNANCY, ANTEPARTUM (HCC): ICD-10-CM

## 2024-11-08 DIAGNOSIS — Z34.92 PRENATAL CARE, SECOND TRIMESTER (HCC): Primary | ICD-10-CM

## 2024-11-08 DIAGNOSIS — Z34.81 ENCOUNTER FOR SUPERVISION OF OTHER NORMAL PREGNANCY IN FIRST TRIMESTER (HCC): ICD-10-CM

## 2024-11-08 LAB
ANTIBODY SCREEN: NEGATIVE
BASOPHILS # BLD AUTO: 0.03 X10(3) UL (ref 0–0.2)
BASOPHILS NFR BLD AUTO: 0.3 %
DEPRECATED RDW RBC AUTO: 44.6 FL (ref 35.1–46.3)
EOSINOPHIL # BLD AUTO: 0.09 X10(3) UL (ref 0–0.7)
EOSINOPHIL NFR BLD AUTO: 0.8 %
ERYTHROCYTE [DISTWIDTH] IN BLOOD BY AUTOMATED COUNT: 16.5 % (ref 11–15)
EST. AVERAGE GLUCOSE BLD GHB EST-MCNC: 103 MG/DL (ref 68–126)
HBA1C MFR BLD: 5.2 % (ref ?–5.7)
HBV SURFACE AG SER-ACNC: <0.1 [IU]/L
HBV SURFACE AG SERPL QL IA: NONREACTIVE
HCT VFR BLD AUTO: 36 %
HCV AB SERPL QL IA: NONREACTIVE
HGB BLD-MCNC: 11.4 G/DL
IMM GRANULOCYTES # BLD AUTO: 0.08 X10(3) UL (ref 0–1)
IMM GRANULOCYTES NFR BLD: 0.7 %
LYMPHOCYTES # BLD AUTO: 1.47 X10(3) UL (ref 1–4)
LYMPHOCYTES NFR BLD AUTO: 13.7 %
MCH RBC QN AUTO: 24.1 PG (ref 26–34)
MCHC RBC AUTO-ENTMCNC: 31.7 G/DL (ref 31–37)
MCV RBC AUTO: 76.1 FL
MONOCYTES # BLD AUTO: 0.6 X10(3) UL (ref 0.1–1)
MONOCYTES NFR BLD AUTO: 5.6 %
NEUTROPHILS # BLD AUTO: 8.45 X10 (3) UL (ref 1.5–7.7)
NEUTROPHILS # BLD AUTO: 8.45 X10(3) UL (ref 1.5–7.7)
NEUTROPHILS NFR BLD AUTO: 78.9 %
PLATELET # BLD AUTO: 275 10(3)UL (ref 150–450)
RBC # BLD AUTO: 4.73 X10(6)UL
RH BLOOD TYPE: POSITIVE
RUBV IGG SER QL: POSITIVE
RUBV IGG SER-ACNC: 369 IU/ML (ref 10–?)
T PALLIDUM AB SER QL IA: NONREACTIVE
TSI SER-ACNC: 0.82 UIU/ML (ref 0.55–4.78)
WBC # BLD AUTO: 10.7 X10(3) UL (ref 4–11)

## 2024-11-08 PROCEDURE — 87340 HEPATITIS B SURFACE AG IA: CPT

## 2024-11-08 PROCEDURE — 86787 VARICELLA-ZOSTER ANTIBODY: CPT

## 2024-11-08 PROCEDURE — 36415 COLL VENOUS BLD VENIPUNCTURE: CPT

## 2024-11-08 PROCEDURE — 86803 HEPATITIS C AB TEST: CPT

## 2024-11-08 PROCEDURE — 86762 RUBELLA ANTIBODY: CPT

## 2024-11-08 PROCEDURE — 83021 HEMOGLOBIN CHROMOTOGRAPHY: CPT

## 2024-11-08 PROCEDURE — 86850 RBC ANTIBODY SCREEN: CPT

## 2024-11-08 PROCEDURE — 86780 TREPONEMA PALLIDUM: CPT

## 2024-11-08 PROCEDURE — 86900 BLOOD TYPING SEROLOGIC ABO: CPT

## 2024-11-08 PROCEDURE — 83036 HEMOGLOBIN GLYCOSYLATED A1C: CPT

## 2024-11-08 PROCEDURE — 86901 BLOOD TYPING SEROLOGIC RH(D): CPT

## 2024-11-08 PROCEDURE — 84443 ASSAY THYROID STIM HORMONE: CPT

## 2024-11-08 PROCEDURE — 87086 URINE CULTURE/COLONY COUNT: CPT

## 2024-11-08 PROCEDURE — 0500F INITIAL PRENATAL CARE VISIT: CPT | Performed by: ADVANCED PRACTICE MIDWIFE

## 2024-11-08 PROCEDURE — 87389 HIV-1 AG W/HIV-1&-2 AB AG IA: CPT

## 2024-11-08 PROCEDURE — 85025 COMPLETE CBC W/AUTO DIFF WBC: CPT

## 2024-11-08 PROCEDURE — 90471 IMMUNIZATION ADMIN: CPT | Performed by: ADVANCED PRACTICE MIDWIFE

## 2024-11-08 PROCEDURE — 90656 IIV3 VACC NO PRSV 0.5 ML IM: CPT | Performed by: ADVANCED PRACTICE MIDWIFE

## 2024-11-08 PROCEDURE — 83020 HEMOGLOBIN ELECTROPHORESIS: CPT

## 2024-11-08 NOTE — PROGRESS NOTES
Here for NOB visit.      Patient's last menstrual period was 2024 (exact date). 2025, by Last Menstrual Period 12w5d     NOB labs- needs to drawn   Genetic screening-  NT normal awaiting FDNA  Ultrasound: completed  Med hx: Obesity  Allergies: NKDA. If PCN allergy refer to allergist.   Problem list- Updated  Abuse/Feel safe in home- denies      Pre-e risk: low risk  Prior births:C/S with successful     Bedside ultrasound +FHT +FM    Warning signs reviewed.

## 2024-11-11 ENCOUNTER — TELEPHONE (OUTPATIENT)
Dept: PERINATAL CARE | Facility: HOSPITAL | Age: 34
End: 2024-11-11

## 2024-11-11 LAB
C TRACH DNA SPEC QL NAA+PROBE: NEGATIVE
N GONORRHOEA DNA SPEC QL NAA+PROBE: NEGATIVE
VZV IGG SER IA-ACNC: 7.99 (ref 1–?)

## 2024-11-11 NOTE — TELEPHONE ENCOUNTER
[place] screening results  Reviewed by MFM Dr. Moya    Low Risk for Aneuploidies, triploidy and Monosomy X  Fetal Sex: Female    Phone call to pt to report results since [facility]  Copy of results sent for scanning into pt record

## 2024-11-13 LAB
HGB A2 MFR BLD: 2.5 % (ref 1.5–3.5)
HGB PNL BLD ELPH: 97.5 % (ref 95.5–100)

## 2024-12-06 ENCOUNTER — TELEPHONE (OUTPATIENT)
Dept: OBGYN CLINIC | Facility: CLINIC | Age: 34
End: 2024-12-06

## 2024-12-06 ENCOUNTER — ROUTINE PRENATAL (OUTPATIENT)
Dept: OBGYN CLINIC | Facility: CLINIC | Age: 34
End: 2024-12-06

## 2024-12-06 VITALS
DIASTOLIC BLOOD PRESSURE: 70 MMHG | HEART RATE: 89 BPM | BODY MASS INDEX: 33 KG/M2 | WEIGHT: 209 LBS | SYSTOLIC BLOOD PRESSURE: 110 MMHG

## 2024-12-06 DIAGNOSIS — N76.0 VAGINITIS AND VULVOVAGINITIS: ICD-10-CM

## 2024-12-06 DIAGNOSIS — O09.299: ICD-10-CM

## 2024-12-06 DIAGNOSIS — Z34.82 PRENATAL CARE, SUBSEQUENT PREGNANCY IN SECOND TRIMESTER (HCC): Primary | ICD-10-CM

## 2024-12-06 DIAGNOSIS — O99.210 OBESITY IN PREGNANCY (HCC): ICD-10-CM

## 2024-12-06 DIAGNOSIS — Z11.3 SCREENING FOR STD (SEXUALLY TRANSMITTED DISEASE): ICD-10-CM

## 2024-12-06 PROCEDURE — 87591 N.GONORRHOEAE DNA AMP PROB: CPT | Performed by: ADVANCED PRACTICE MIDWIFE

## 2024-12-06 PROCEDURE — 87491 CHLMYD TRACH DNA AMP PROBE: CPT | Performed by: ADVANCED PRACTICE MIDWIFE

## 2024-12-06 PROCEDURE — 81514 NFCT DS BV&VAGINITIS DNA ALG: CPT | Performed by: ADVANCED PRACTICE MIDWIFE

## 2024-12-06 NOTE — PROGRESS NOTES
Melia, , is at 16w5d, here for her JAZZMINE visit.  Currently, she is feeling well. Denies 2nd trimester danger signs. Not feeling fetal movement yet.  Has been having greenish and yellowish vaginal discharge and malodor. Had BV in previous pregnancies as well and vaginal cream helped.   Discussed transportation needs as  [place] currently [place] of [place] on business and to call insurance ahead of time to set up ride.    Vital signs and weight reviewed  See flowsheets  Vulva: WNL  Vagina: thick, clumpy yellow discharge  Cervix: WNL    Assessment/Plan: Anatomy US ordered. AFP not reviewed with pt today. Will need offered at next visit  Vaginitis: vaginal culture and GC/CT sent. eRx clotrimazole  Next visit: 4 weeks. Offer AFP then    Reviewed:   Prenatal visit schedule  Emergency contact info and safe use of messaging in MyChart  2nd trimester precautions and expectations  Danger signs    I have spent 20 minutes total time on the day of the encounter, including: preparing to see the patient, ordering/reviewing labs, performing a medically appropriate exam, and providing care coordination. face to face counseling, chart review, orders and coordination of care    Pt verbalized understanding. All questions answered. No barriers to learning identified    RAEGAN Duque under direct supervision of Nya Cardona CNM    Patient seen in conjunction with WINDY. I personally witnessed the patient's exam and medical decision making on this date of service.  I was physically present in the room for the performance of the E/M service.  I have reviewed the TOMEKA student's documentation and findings including history, Exam, and Medical Decision Making, edited the document for accuracy and verify that it represents the clinical findings and services performed.

## 2024-12-06 NOTE — PATIENT INSTRUCTIONS
Comfort Tips During Pregnancy    Talk with your healthcare provider before using pain-relieving medicine at any time during your pregnancy.  First trimester tips  Nausea  Get up slowly. Eat a few unsalted crackers before you get out of bed.  Avoid smells that bother you.  Eat small bland low fat, light high-protein meals at frequent intervals.  Sip on water, weak tea, or clear soft drinks, like ginger ale. Eat ice chips.  Fatigue  Take catnaps when you can.  Get regular exercise.  Accept help from others.  Practice good sleep habits, like going to bed and getting up at the same time each day. Use your bed only for sleep and sex.  Mood swings  Talk about your feelings with others, including other mothers.  Limit sugar, chocolate, and caffeine.  Eat a healthy diet. Don’t skip meals.  Get regular exercise.  Headaches  Get fresh air and exercise.  Relax and get enough rest.  Check with your healthcare provider before taking any pain medicines.  Second trimester tips  Here are some suggestions to help you cope:  To limit ankle swelling, sit with your feet raised or wear support hose.  If you have pain in your groin and stomach (round ligament pain), avoid sudden twisting movements.  For leg cramps, flexing your foot often brings immediate relief. You also may try massaging your calf in long, downward strokes, or stretching your legs before going to bed. Get enough exercise and wear shoes with flexible soles.  Third trimester tips  Reducing heartburn  Eat small, light meals throughout the day rather than 3 large ones.  Sleep with your upper body raised 6 inches. Don’t lie down until 2 hours after you eat.  Don't eat greasy, fried, or spicy foods.  Avoid citrus fruits and juices.  Treating constipation  Eat foods high in fiber (whole-grain foods, fresh fruit and vegetables).  Drink plenty of water.  Get regular exercise.  Discuss other medicines (like docusate and psyllium) with your healthcare provider.  Taking care  of your breasts  Avoid using harsh soaps or alcohol, which can cause excessive dryness.  Wear nursing bras. They provide more support than regular bras and can be used after pregnancy if you breastfeed.  Getting a good night’s sleep  Take a warm shower before bed.  Sleep on a firm mattress.  Lie on your side with 1 leg crossed over the other.  Use pillows to support arms, legs, and belly.  [employer] last reviewed this educational content on 10/1/2017  © 8203-9627 [employer]. [address] [place] [address]. All rights reserved. This information is not intended as a substitute for professional medical care. Always follow your healthcare professional's instructions.        Adapting to Pregnancy: Second Trimester    Keep up the healthy habits you started in your first trimester. You might be a little more tired than normal. So plan your day wisely. Look at the tips below and choose the ones that suit your lifestyle.  If you have any questions, check with your healthcare provider.  If you work  If you can, adjust your work with your employer to fit your needs. Try these tips:  If you stand for long periods, find ways to do some tasks while sitting. Also, try to stand with 1 foot resting on a low stool or ledge. Shift your weight from foot to foot often. Wear low-heeled shoes.  If you sit, keep your knees level with your hips. Rest your feet on a firm surface. Sit tall with support for your low back.  If you work long hours, ask about adjusting your schedule. Try taking shorter breaks more often.  When you travel  The second trimester may be the best time for any travel. Talk to your healthcare provider about any special plans you may need to make. Always:  Wear a seat belt. Fasten the lap part under your belly. Wear the shoulder part also.  Take breaks often during long trips by car or plane. Move around to stretch your legs.  Drink plenty of fluids on flights. The air in plane cabins is very  dry.  Avoid hot climates or high altitudes if you are not used to them.  Avoid places where the food and water might make you sick.  Make sure you are up-to-date on all immunizations, including the flu vaccine. This is especially important when traveling overseas.  Taking time to relax  Find time to rest and relax at work or at home:  Take short time-outs daily. Do relaxation exercises.  Breathe deeply during stressful times.  Try not to take on too much. Plan tasks for times when you have the most energy.  Take naps when you can. Or just sit and relax.  After week 16, avoid lying on your back for more than a few minutes. Instead, lie on your side. Switch sides often.  Continuing as lovers  Unless your healthcare provider tells you otherwise, there is no reason to stop having sex now. Blood supply increases to the pelvic area in the second trimester. Because of this, sex might be more enjoyable. Try different positions and see what’s best. Also, talk to your partner about any changes in desire. Spotting may happen after sex. Be sure to let your healthcare provider know if there is heavy bleeding.  Keeping your environment safe  You can still clean house and use scented products. Just take some simple precautions:  Wear gloves when using cleaning fluids.  Open windows to let in fresh air. Use a fan if you paint.  Avoid secondhand smoke.  Don’t breathe fumes from nail polish, hair spray, cleansers, or other chemicals.  [employer] last reviewed this educational content on 1/1/2018  © 2779-1457 The [employer], [employer]. [address] [place] [address], [place] [id number]. All rights reserved. This information is not intended as a substitute for professional medical care. Always follow your healthcare professional's instructions.        Pregnancy: Your Second Trimester Changes  Each day, you and your baby are changing and growing together. Here’s a quick look at what’s happening to both of you.  How you are changing  Even when you  don’t notice it, your body is adapting to meet the needs of your growing baby. The changes in your body might also affect your moods.  Your body  Your uterus expands as baby grows. As the weeks go by, you will feel more pressure on your bladder, stomach, and other organs. You may notice some skin color changes on your forehead, nose, or cheeks. Freckles may darken, and moles may grow. You may notice a darker line on your abdomen between your belly button and pubic bone in the midline.  Your moods  The second trimester is often easier than the first. Still, be prepared for mood swings. These are due to the increase in hormones (chemicals that affect the way organs work) produced by your body. These mood swings are a normal part of pregnancy.  How your baby is growing          Month 4  Baby’s heartbeat may be heard with a Doppler (hand-held ultrasound device) by 9 to 10 weeks. Eyebrows, eyelashes, and fingernails begin to form.  Month 5  You may feel your baby move. After a growth spurt, your baby nears 10 inches. Month 6  Baby’s fingerprints have formed. Your baby weighs about 1 to 2 pounds and is about 12 inches long.   [employer] last reviewed this educational content on 1/1/2018  © 2669-6997 The [employer], [employer]. [address] [place] [address], [place] [id number]. All rights reserved. This information is not intended as a substitute for professional medical care. Always follow your healthcare professional's instructions.     Pregnancy: Body Changes  From conception (fertilization) until after the birth of your child, you and your baby will change every day. To help you understand what is happening, we’ve outlined how pregnancy begins and some of the changes you may notice.    Your changing body  Pregnancy affects almost every part of your body. You may notice some of the following physical and emotional changes:  Your uterus expands outward and upward as your baby grows. You may feel pressure on your bladder, stomach,  and other organs.  You may notice skin color changes on your forehead, nose, and cheeks. A dark line may form from your bellybutton down to your pubic area. The skin color around your nipples and thighs may also change.  Pink stretch marks may appear on your abdomen, breasts, or hips.  Your hair may seem thicker. You lose less hair during pregnancy.  You may feel fine 1 day and weepy the next. This is caused by changes in your body, like increased hormones. These are chemicals that affect the function of certain organs and also your moods.  You may experience constipation, hemorrhoids, and/or heartburn.  You may experience mild shortness of breath.  Your legs may cramp.  You may have nausea and vomiting.  You may experience dizziness, extreme tiredness, and sleep problems.  You may experience temporary bladder control problems.  Nose bleeds and nasal stuffiness are common.     The fertilized egg travels down the fallopian tube and attaches to the uterus.    How pregnancy begins  Conception is the union of a sperm and an egg. When it happens, your baby’s genetic makeup is complete, even its sex. Fertilization takes place in the fallopian tube. The fertilized egg then travels down this tube to the uterus (womb). The egg attaches to the lining of the uterus about a week after conception. There it grows and is nourished.  [employer] last reviewed this educational content on 1/1/2018  © 1488-1741 The [employer], [employer]. [address] [place] [address], [place] [address]. All rights reserved. This information is not intended as a substitute for professional medical care. Always follow your healthcare professional's instructions.        Pregnancy: Common Questions  There are plenty of myths and “old wives’ tales” surrounding pregnancy. You may need help  fact from fiction. On this sheet, you’ll find answers to a few common questions. If you have other questions, talk with your healthcare provider.     Will working harm  my baby?  In most cases, working throughout your pregnancy is not harmful at all. There may be concerns if the job involves dangerous machinery or chemicals, lifting, or standing for very long periods of time. Talk to your healthcare provider and employer about your particular job and pregnancy.   Is it safe to have sexual relations during pregnancy?  Yes, unless you are specifically instructed not to by your healthcare provider.  Why can’t I change the cat litter box?  Cats carry a disease called toxoplasmosis. In adult humans, it shows up as a mild infection of the blood and organs. If you are infected during pregnancy, the baby’s brain and eyes could be damaged. To be safe, have someone else change the litter. If you must handle it, wear a paper mask over your nose and mouth. Also, wear gloves and wash your hands afterward.   Which medicines are safe?  No prescription or over-the-counter medicine is safe for everyone all of the time. But sometimes medicines are needed.  Be sure your healthcare provider knows you are pregnant. Then use only the medicines he or she advises you to take.   Is it true that I can overheat my baby?  Yes. To avoid making your baby too warm:  Don’t sit in a Jacuzzi. A long, warm bath is fine, but not in water over 100°F (37.7°C).  Exercise less intensely if you feel fatigued. Base your workout on how you feel, not your heart rate. Heart rates aren’t a good way to measure effort during pregnancy.  Can I lift and carry safely?  Yes, if your healthcare provider doesn’t tell you otherwise. Learn to lift and carry safely to avoid injury and reduce back pain during pregnancy. To protect your back:   Bend at the knees to bring the load nearer.  Get a good . Test the weight of the load.  Tighten your belly. Exhale as you lift.  Lift with your legs, not with your back.  Carry the load close to your body.  Hold the load so you can see where you are going.  What if I get sick?  Most women get  sick at least once during pregnancy. Talk with your healthcare provider if you do. Most likely it will not affect your pregnancy. Get plenty of rest and fluids, and eat what you can. Talk to your healthcare provider before taking any medicines.   Sam last reviewed this educational content on 10/1/2017  © 7944-5149 The [employer]. [address] [place] [address], [place] [address]. All rights reserved. This information is not intended as a substitute for professional medical care. Always follow your healthcare professional's instructions.        Pregnancy: More Common Questions  On this sheet, you’ll find answers to some common questions about pregnancy. If you have other questions, talk with your healthcare provider.    Will traveling be too stressful?  Not if you set the pace. When you plan a trip, allow time to stop and rest. You may even want to postpone travel until the second trimester, when your body is more adjusted to pregnancy. Don’t wait as long as the third trimester, because of the possibility of going into early labor. Travel to a location where healthcare facilities are close by. You may want to take a copy of your records with you in case you need medical care when you are traveling.  Can I still be a vegetarian?  Yes. Be sure to consult a registered dietitian. And be sure to get enough of the following:  Protein. Eat eggs and milk if you’re an ovo-lacto vegetarian. Eat vegetable proteins, like tofu and beans, if you’re a vegan.  Calcium. If you don’t eat dairy, try soy milk, soy cheese fortified with calcium, and orange juice fortified with calcium.  Vitamin B12. You may need to take a supplement that includes folic acid.  Vitamin D. If you don’t drink milk, ask about taking a supplement.  Iron. Your healthcare provider may recommend a supplement.  Can I paint my nails?  Yes. Nail polish is not thought to be dangerous during pregnancy. Just be careful about breathing the fumes. Keep windows  open or use a fan. However, long-term exposure to the solvents used to remove nail polish may not be safe. If you work in a nail salon, talk with your healthcare provider about safety concerns.  Should I eat more if I exercise?  You will only need an extra 100 calories for each 30 minutes of mild exercise. But you’ll also need more fluids. Drink at least an extra 8 ounces of water.  Do I have to stop jogging?  You can jog as long as you are comfortable. Many women find the impact or bounce of a jog doesn’t feel good. Some switch to brisk walking as their pregnancy advances.  What should I limit or avoid eating or drinking?  Don't drink unpasteurized milk products or juices.  Don't eat raw or undercooked meat, poultry, fish, or eggs.   Don't eat prepared meats, like hot dogs or deli meat, unless served steaming hot.  Don't drink alcohol.  Limit caffeine unless your healthcare provider tells you otherwise.    Can I lift weights?  If you have been lifting weights, there is no need to stop. Instead, keep the weights light and in control. Never hold your breath. If you haven’t been lifting weights, don’t start now.  [employer] last reviewed this educational content on 10/1/2017  © 6849-9461 The [employer], [employer]. [address] [place] [address]. All rights reserved. This information is not intended as a substitute for professional medical care. Always follow your healthcare professional's instructions. Vaginal Infection: Understanding the Vaginal Environment  The vagina is a canal. It connects the uterus (womb) to the outside of the body. It is home to many types of bacteria and other tiny organisms. These different bacteria most often stay balanced in number. This keeps the vagina healthy. If the balance changes, it can cause infection.   A healthy environment  Many types of bacteria are present in a healthy vagina. When balanced, they don’t cause problems. Small amounts of yeast may also be present without  causing problems. The most common type of bacteria in the vagina is lactobacillus. It helps keep the vagina at a low pH. A low pH keeps bad bacteria from taking over.  Normal vaginal discharge  The vagina makes fluid. It is sent out as discharge. This also keeps the vagina healthy. Normal discharge can be clear, white, or yellowish. Most women find that normal discharge varies in amount and color through the month.  An unhealthy environment  The vaginal environment may get out of balance. This may result in a vaginal infection. There are a few reasons this can happen. The pH may have changed. The amount of one organism, such as yeast, may increase. Or an outside organism may get into the vagina and throw off the balance:  Bacterial vaginosis (BV). BV is due to an imbalance in the normal bacteria in the vagina. Lactobacillus bacteria decrease. As a result, the numbers of bad bacteria increase.  Candidiasis (yeast infection). Yeast is a type of fungus. A yeast infection occurs when yeast cells in the vagina increase. They then attack vaginal tissues. A type of yeast called Candida albicans is often involved.  Trichomoniasis (“trich”). Trich is a parasite. It is passed from one person to another during sex. Men with trich often don’t have any symptoms. In women, it can take weeks or months before symptoms appear.  [employer] last reviewed this educational content on 3/1/2017  © 1494-4238 The [employer], [employer]. [address] [place] [address]. All rights reserved. This information is not intended as a substitute for professional medical care. Always follow your healthcare professional's instructions.        Preventing Vaginitis     Use mild, unscented soap when you bathe or shower to avoid irritating your vagina.    Vaginitis is irritation or infection of the vagina or the outside opening of it (vulva). Vaginitis can be caused by bacteria, viruses, parasites, or yeast. Chemicals such as in perfumes or soaps  or in spermicides can sometimes be a cause. Vaginitis can be caused by hormone changes in pregnancy or with menopause. You can help prevent vaginitis. Follow the tips below. And see your healthcare provider if you have any symptoms.   Hygiene  Stay away from chemicals. Don't use vaginal sprays. Don't use scented toilet paper or tampons that are scented. Sprays and scents have chemicals that can irritate your vagina.  Don't douche unless you are told to by your healthcare provider. Douching is rarely needed. And it upsets the normal balance in the vagina.  Wash yourself well. Wash the outer vaginal area (vulva) every day with mild, unscented soap. Keep it as dry as possible.  Wipe correctly. Make sure to wipe from front to back after a bowel movement. This helps keep from spreading bacteria from your anus to your vagina.  Change your tampon often. During your period, make sure to change your tampon as often as directed on the package. This allows the normal flow of vaginal discharge and blood.    Lifestyle  Limit your number of sexual partners. The more partners you have, the greater your risk of infection. Using condoms helps reduce your risk.  Get enough sleep. Sleep helps keep your body’s immune system healthy. This helps you fight infection.  Lose weight, if needed. Excess weight can reduce air circulation around your vagina. This can increase your risk of infection.  Exercise regularly. Regular activity helps keep your body healthy.  Take antibiotics only as directed. Antibiotics can change the normal chemical balance in the vagina.      Clothing  Don’t sit in wet clothes. Yeast thrives when it’s warm and damp.  Don’t wear tight pants. And don’t wear tights, leggings, or hose without a cotton crotch. These types of clothing trap warmth and moisture.  Wear cotton underwear. Cotton lets air circulate around the vagina.    Symptoms of vaginitis  Irritation, swelling, or itching of the genital area  Vaginal  discharge  Bad vaginal odor  Pain or burning during urination    Sam last reviewed this educational content on 11/1/2019  © 4043-5156 The [employer]. [address] [place] [address], [place] [address]. All rights reserved. This information is not intended as a substitute for professional medical care. Always follow your healthcare professional's instructions.

## 2024-12-06 NOTE — TELEPHONE ENCOUNTER
Pager sent over to Michelle Pack.    Upon following up, [facility] calling for clarification on prescription sent.    Spoke with pharmacist, prescription for 2% is no longer available. Clotrimazole 1% is available over the counter.    Spoke with Nya Cardona in office, states patient is okay to use Clotrimazole 1% for 7 days.      Patient verified name and     Informed of recommendations. Voiced understanding, [employer] message sent with medication information.

## 2024-12-07 LAB
BV BACTERIA DNA VAG QL NAA+PROBE: NEGATIVE
C GLABRATA DNA VAG QL NAA+PROBE: NEGATIVE
C KRUSEI DNA VAG QL NAA+PROBE: NEGATIVE
CANDIDA DNA VAG QL NAA+PROBE: POSITIVE
T VAGINALIS DNA VAG QL NAA+PROBE: NEGATIVE

## 2024-12-09 LAB
C TRACH DNA SPEC QL NAA+PROBE: NEGATIVE
N GONORRHOEA DNA SPEC QL NAA+PROBE: NEGATIVE

## 2024-12-16 ENCOUNTER — TELEPHONE (OUTPATIENT)
Dept: OBGYN CLINIC | Facility: CLINIC | Age: 34
End: 2024-12-16

## 2024-12-16 NOTE — TELEPHONE ENCOUNTER
LMTCB    ----- Message from Nya Cardona sent at 12/15/2024 11:36 AM CST -----  Pt did not view my [employer] message regarding her results and plan. Please call her and inform her of results and plan as specified in my message.     Thanks,        Jabier Cárdenas -     Your vaginal testing came back and you do have a yeast infection. The cream that I prescribed should have resolved your symptoms and treated the infection successfully. If you have any questions or need follow-up information, please call the office at [phone].     Regards,     Nya Cardona, KENYON APRN CNM

## 2024-12-17 NOTE — TELEPHONE ENCOUNTER
Pt name and  verified     Pt informed of results and recommendations. Pt verbalized understanding and agreed.  Pt states pharmacy does not have cream in stock. Recommended pt to find out when prescription in stock. Pt aware to call if she needs medication sent to different pharmacy. Pt also aware to call office if symptoms are unresolved.

## 2025-01-03 ENCOUNTER — LAB ENCOUNTER (OUTPATIENT)
Dept: LAB | Facility: HOSPITAL | Age: 35
End: 2025-01-03
Attending: ADVANCED PRACTICE MIDWIFE
Payer: MEDICAID

## 2025-01-03 ENCOUNTER — ROUTINE PRENATAL (OUTPATIENT)
Dept: OBGYN CLINIC | Facility: CLINIC | Age: 35
End: 2025-01-03

## 2025-01-03 VITALS
SYSTOLIC BLOOD PRESSURE: 104 MMHG | DIASTOLIC BLOOD PRESSURE: 71 MMHG | WEIGHT: 214 LBS | BODY MASS INDEX: 34 KG/M2 | HEART RATE: 90 BPM

## 2025-01-03 DIAGNOSIS — Z34.82 PRENATAL CARE, SUBSEQUENT PREGNANCY IN SECOND TRIMESTER (HCC): ICD-10-CM

## 2025-01-03 DIAGNOSIS — Z34.82 PRENATAL CARE, SUBSEQUENT PREGNANCY IN SECOND TRIMESTER (HCC): Primary | ICD-10-CM

## 2025-01-03 PROCEDURE — 99213 OFFICE O/P EST LOW 20 MIN: CPT | Performed by: ADVANCED PRACTICE MIDWIFE

## 2025-01-03 PROCEDURE — 36415 COLL VENOUS BLD VENIPUNCTURE: CPT

## 2025-01-03 PROCEDURE — 82105 ALPHA-FETOPROTEIN SERUM: CPT

## 2025-01-03 NOTE — PATIENT INSTRUCTIONS
Comfort Tips During Pregnancy    Talk with your healthcare provider before using pain-relieving medicine at any time during your pregnancy.  First trimester tips  Nausea  Get up slowly. Eat a few unsalted crackers before you get out of bed.  Avoid smells that bother you.  Eat small bland low fat, light high-protein meals at frequent intervals.  Sip on water, weak tea, or clear soft drinks, like ginger ale. Eat ice chips.  Fatigue  Take catnaps when you can.  Get regular exercise.  Accept help from others.  Practice good sleep habits, like going to bed and getting up at the same time each day. Use your bed only for sleep and sex.  Mood swings  Talk about your feelings with others, including other mothers.  Limit sugar, chocolate, and caffeine.  Eat a healthy diet. Don’t skip meals.  Get regular exercise.  Headaches  Get fresh air and exercise.  Relax and get enough rest.  Check with your healthcare provider before taking any pain medicines.  Second trimester tips  Here are some suggestions to help you cope:  To limit ankle swelling, sit with your feet raised or wear support hose.  If you have pain in your groin and stomach (round ligament pain), avoid sudden twisting movements.  For leg cramps, flexing your foot often brings immediate relief. You also may try massaging your calf in long, downward strokes, or stretching your legs before going to bed. Get enough exercise and wear shoes with flexible soles.  Third trimester tips  Reducing heartburn  Eat small, light meals throughout the day rather than 3 large ones.  Sleep with your upper body raised 6 inches. Don’t lie down until 2 hours after you eat.  Don't eat greasy, fried, or spicy foods.  Avoid citrus fruits and juices.  Treating constipation  Eat foods high in fiber (whole-grain foods, fresh fruit and vegetables).  Drink plenty of water.  Get regular exercise.  Discuss other medicines (like docusate and psyllium) with your healthcare provider.  Taking care  of your breasts  Avoid using harsh soaps or alcohol, which can cause excessive dryness.  Wear nursing bras. They provide more support than regular bras and can be used after pregnancy if you breastfeed.  Getting a good night’s sleep  Take a warm shower before bed.  Sleep on a firm mattress.  Lie on your side with 1 leg crossed over the other.  Use pillows to support arms, legs, and belly.  [employer] last reviewed this educational content on 10/1/2017  © 0080-4709 The [employer]. [address] [place] [id number]. All rights reserved. This information is not intended as a substitute for professional medical care. Always follow your healthcare professional's instructions.        Adapting to Pregnancy: Second Trimester    Keep up the healthy habits you started in your first trimester. You might be a little more tired than normal. So plan your day wisely. Look at the tips below and choose the ones that suit your lifestyle.  If you have any questions, check with your healthcare provider.  If you work  If you can, adjust your work with your employer to fit your needs. Try these tips:  If you stand for long periods, find ways to do some tasks while sitting. Also, try to stand with 1 foot resting on a low stool or ledge. Shift your weight from foot to foot often. Wear low-heeled shoes.  If you sit, keep your knees level with your hips. Rest your feet on a firm surface. Sit tall with support for your low back.  If you work long hours, ask about adjusting your schedule. Try taking shorter breaks more often.  When you travel  The second trimester may be the best time for any travel. Talk to your healthcare provider about any special plans you may need to make. Always:  Wear a seat belt. Fasten the lap part under your belly. Wear the shoulder part also.  Take breaks often during long trips by car or plane. Move around to stretch your legs.  Drink plenty of fluids on flights. The air in plane cabins is very  dry.  Avoid hot climates or high altitudes if you are not used to them.  Avoid places where the food and water might make you sick.  Make sure you are up-to-date on all immunizations, including the flu vaccine. This is especially important when traveling overseas.  Taking time to relax  Find time to rest and relax at work or at home:  Take short time-outs daily. Do relaxation exercises.  Breathe deeply during stressful times.  Try not to take on too much. Plan tasks for times when you have the most energy.  Take naps when you can. Or just sit and relax.  After week 16, avoid lying on your back for more than a few minutes. Instead, lie on your side. Switch sides often.  Continuing as lovers  Unless your healthcare provider tells you otherwise, there is no reason to stop having sex now. Blood supply increases to the pelvic area in the second trimester. Because of this, sex might be more enjoyable. Try different positions and see what’s best. Also, talk to your partner about any changes in desire. Spotting may happen after sex. Be sure to let your healthcare provider know if there is heavy bleeding.  Keeping your environment safe  You can still clean house and use scented products. Just take some simple precautions:  Wear gloves when using cleaning fluids.  Open windows to let in fresh air. Use a fan if you paint.  Avoid secondhand smoke.  Don’t breathe fumes from nail polish, hair spray, cleansers, or other chemicals.  [employer] last reviewed this educational content on 1/1/2018  © 2079-1507 The [employer]. [address] [place] [address], [place] [id number]. All rights reserved. This information is not intended as a substitute for professional medical care. Always follow your healthcare professional's instructions.        Pregnancy: Your Second Trimester Changes  Each day, you and your baby are changing and growing together. Here’s a quick look at what’s happening to both of you.  How you are changing  Even when you  don’t notice it, your body is adapting to meet the needs of your growing baby. The changes in your body might also affect your moods.  Your body  Your uterus expands as baby grows. As the weeks go by, you will feel more pressure on your bladder, stomach, and other organs. You may notice some skin color changes on your forehead, nose, or cheeks. Freckles may darken, and moles may grow. You may notice a darker line on your abdomen between your belly button and pubic bone in the midline.  Your moods  The second trimester is often easier than the first. Still, be prepared for mood swings. These are due to the increase in hormones (chemicals that affect the way organs work) produced by your body. These mood swings are a normal part of pregnancy.  How your baby is growing          Month 4  Baby’s heartbeat may be heard with a Doppler (hand-held ultrasound device) by 9 to 10 weeks. Eyebrows, eyelashes, and fingernails begin to form.  Month 5  You may feel your baby move. After a growth spurt, your baby nears 10 inches. Month 6  Baby’s fingerprints have formed. Your baby weighs about 1 to 2 pounds and is about 12 inches long.   [employer] last reviewed this educational content on 1/1/2018  © 2712-6414 The [employer], [employer]. [address] [place] [address], [place] [id number]. All rights reserved. This information is not intended as a substitute for professional medical care. Always follow your healthcare professional's instructions.     Pregnancy: Body Changes  From conception (fertilization) until after the birth of your child, you and your baby will change every day. To help you understand what is happening, we’ve outlined how pregnancy begins and some of the changes you may notice.    Your changing body  Pregnancy affects almost every part of your body. You may notice some of the following physical and emotional changes:  Your uterus expands outward and upward as your baby grows. You may feel pressure on your bladder, stomach,  and other organs.  You may notice skin color changes on your forehead, nose, and cheeks. A dark line may form from your bellybutton down to your pubic area. The skin color around your nipples and thighs may also change.  Pink stretch marks may appear on your abdomen, breasts, or hips.  Your hair may seem thicker. You lose less hair during pregnancy.  You may feel fine 1 day and weepy the next. This is caused by changes in your body, like increased hormones. These are chemicals that affect the function of certain organs and also your moods.  You may experience constipation, hemorrhoids, and/or heartburn.  You may experience mild shortness of breath.  Your legs may cramp.  You may have nausea and vomiting.  You may experience dizziness, extreme tiredness, and sleep problems.  You may experience temporary bladder control problems.  Nose bleeds and nasal stuffiness are common.     The fertilized egg travels down the fallopian tube and attaches to the uterus.    How pregnancy begins  Conception is the union of a sperm and an egg. When it happens, your baby’s genetic makeup is complete, even its sex. Fertilization takes place in the fallopian tube. The fertilized egg then travels down this tube to the uterus (womb). The egg attaches to the lining of the uterus about a week after conception. There it grows and is nourished.  [employer] last reviewed this educational content on 1/1/2018  © 1305-3613 [employer]. [address] [place] [address], [place] [address]. All rights reserved. This information is not intended as a substitute for professional medical care. Always follow your healthcare professional's instructions.        Pregnancy: Common Questions  There are plenty of myths and “old wives’ tales” surrounding pregnancy. You may need help  fact from fiction. On this sheet, you’ll find answers to a few common questions. If you have other questions, talk with your healthcare provider.     Will working harm  my baby?  In most cases, working throughout your pregnancy is not harmful at all. There may be concerns if the job involves dangerous machinery or chemicals, lifting, or standing for very long periods of time. Talk to your healthcare provider and employer about your particular job and pregnancy.   Is it safe to have sexual relations during pregnancy?  Yes, unless you are specifically instructed not to by your healthcare provider.  Why can’t I change the cat litter box?  Cats carry a disease called toxoplasmosis. In adult humans, it shows up as a mild infection of the blood and organs. If you are infected during pregnancy, the baby’s brain and eyes could be damaged. To be safe, have someone else change the litter. If you must handle it, wear a paper mask over your nose and mouth. Also, wear gloves and wash your hands afterward.   Which medicines are safe?  No prescription or over-the-counter medicine is safe for everyone all of the time. But sometimes medicines are needed.  Be sure your healthcare provider knows you are pregnant. Then use only the medicines he or she advises you to take.   Is it true that I can overheat my baby?  Yes. To avoid making your baby too warm:  Don’t sit in a Jacuzzi. A long, warm bath is fine, but not in water over 100°F (37.7°C).  Exercise less intensely if you feel fatigued. Base your workout on how you feel, not your heart rate. Heart rates aren’t a good way to measure effort during pregnancy.  Can I lift and carry safely?  Yes, if your healthcare provider doesn’t tell you otherwise. Learn to lift and carry safely to avoid injury and reduce back pain during pregnancy. To protect your back:   Bend at the knees to bring the load nearer.  Get a good . Test the weight of the load.  Tighten your belly. Exhale as you lift.  Lift with your legs, not with your back.  Carry the load close to your body.  Hold the load so you can see where you are going.  What if I get sick?  Most women get  sick at least once during pregnancy. Talk with your healthcare provider if you do. Most likely it will not affect your pregnancy. Get plenty of rest and fluids, and eat what you can. Talk to your healthcare provider before taking any medicines.   [employer] last reviewed this educational content on 10/1/2017  © 5615-8495 The [employer]. [address] [place] [address], [place] [address]. All rights reserved. This information is not intended as a substitute for professional medical care. Always follow your healthcare professional's instructions.        Pregnancy: More Common Questions  On this sheet, you’ll find answers to some common questions about pregnancy. If you have other questions, talk with your healthcare provider.    Will traveling be too stressful?  Not if you set the pace. When you plan a trip, allow time to stop and rest. You may even want to postpone travel until the second trimester, when your body is more adjusted to pregnancy. Don’t wait as long as the third trimester, because of the possibility of going into early labor. Travel to a location where healthcare facilities are close by. You may want to take a copy of your records with you in case you need medical care when you are traveling.  Can I still be a vegetarian?  Yes. Be sure to consult a registered dietitian. And be sure to get enough of the following:  Protein. Eat eggs and milk if you’re an ovo-lacto vegetarian. Eat vegetable proteins, like tofu and beans, if you’re a vegan.  Calcium. If you don’t eat dairy, try soy milk, soy cheese fortified with calcium, and orange juice fortified with calcium.  Vitamin B12. You may need to take a supplement that includes folic acid.  Vitamin D. If you don’t drink milk, ask about taking a supplement.  Iron. Your healthcare provider may recommend a supplement.  Can I paint my nails?  Yes. Nail polish is not thought to be dangerous during pregnancy. Just be careful about breathing the fumes. Keep windows  open or use a fan. However, long-term exposure to the solvents used to remove nail polish may not be safe. If you work in a nail salon, talk with your healthcare provider about safety concerns.  Should I eat more if I exercise?  You will only need an extra 100 calories for each 30 minutes of mild exercise. But you’ll also need more fluids. Drink at least an extra 8 ounces of water.  Do I have to stop jogging?  You can jog as long as you are comfortable. Many women find the impact or bounce of a jog doesn’t feel good. Some switch to brisk walking as their pregnancy advances.  What should I limit or avoid eating or drinking?  Don't drink unpasteurized milk products or juices.  Don't eat raw or undercooked meat, poultry, fish, or eggs.   Don't eat prepared meats, like hot dogs or deli meat, unless served steaming hot.  Don't drink alcohol.  Limit caffeine unless your healthcare provider tells you otherwise.    Can I lift weights?  If you have been lifting weights, there is no need to stop. Instead, keep the weights light and in control. Never hold your breath. If you haven’t been lifting weights, don’t start now.  [employer] last reviewed this educational content on 10/1/2017  © 3293-4418 The [employer], [employer]. [address] [place] [address], [place] [address]. All rights reserved. This information is not intended as a substitute for professional medical care. Always follow your healthcare professional's instructions.

## 2025-01-03 NOTE — PROGRESS NOTES
Subjective  Melia Mendoza is a 34 year old F  currently 20w5d d/b LMP c/w 8wk US who presents for JAZZMINE.    Feeling well today. Has not been feeling any movement with this pregnancy yet is concerned as she had felt movement earlier with other pregnancy, noted on previous ultrasound placenta is anterior. Still experiencing yeast infection symptoms, having copious amounts of discharge was not able to  cream from pharmacy previously. Denies vaginal bleeding, loss of fluid, and uterine contractions.   Discussed AFP and desires testing.     Flu Shot: UTD    Objective  Vitals:    25 1227   BP: 104/71   Pulse: 90     Assessment/Plan  Melia Mendoza is a 34 year old F  currently 20w5d  Patient Active Problem List   Diagnosis    Previous  section    Obesity in pregnancy ([facility])    Preferred language is Slovenian    History of macrosomia in infant in prior pregnancy, currently pregnant ([facility])     (vaginal birth after ) ([facility])    Third degree laceration of perineum during delivery, postpartum ([facility])    Child with Down syndrome     -AFP ordered  -Anatomy ultrasound 2024  - To  Clotrimazole OTC since pharmacy did not provide  -Danger signs/symptoms reviewed and when to call midwife  RTC 4 weeks    I have spent 20 minutes total time on the day of the encounter, including: preparing to see the patient, ordering/reviewing labs, performing a medically appropriate exam, and providing care coordination. face to face counseling, chart review, orders and coordination of care    Pt verbalized understanding. All questions answered. No barriers to learning identified    RAEGAN Duque under direct supervision of Nya Cardona CNM    Patient seen in conjunction with WINDY. I personally witnessed the patient's exam and medical decision making on this date of service.  I was physically present in the room for the performance of the E/M service.  I have reviewed the TOMEKA student's documentation and  findings including history, Exam, and Medical Decision Making, edited the document for accuracy and verify that it represents the clinical findings and services performed.

## 2025-01-06 ENCOUNTER — HOSPITAL ENCOUNTER (OUTPATIENT)
Dept: PERINATAL CARE | Facility: HOSPITAL | Age: 35
Discharge: HOME | End: 2025-01-06
Attending: ADVANCED PRACTICE MIDWIFE
Payer: MEDICAID

## 2025-01-06 ENCOUNTER — HOSPITAL ENCOUNTER (OUTPATIENT)
Dept: PERINATAL CARE | Facility: HOSPITAL | Age: 35
End: 2025-01-06
Attending: ADVANCED PRACTICE MIDWIFE
Payer: MEDICAID

## 2025-01-06 VITALS
DIASTOLIC BLOOD PRESSURE: 76 MMHG | SYSTOLIC BLOOD PRESSURE: 112 MMHG | WEIGHT: 214 LBS | BODY MASS INDEX: 34 KG/M2 | HEART RATE: 87 BPM

## 2025-01-06 DIAGNOSIS — O99.210 OBESITY IN PREGNANCY (HCC): ICD-10-CM

## 2025-01-06 DIAGNOSIS — O09.299: ICD-10-CM

## 2025-01-06 DIAGNOSIS — O09.299 HISTORY OF MACROSOMIA IN INFANT IN PRIOR PREGNANCY, CURRENTLY PREGNANT (HCC): ICD-10-CM

## 2025-01-06 DIAGNOSIS — O09.299: Primary | ICD-10-CM

## 2025-01-06 PROCEDURE — 76811 OB US DETAILED SNGL FETUS: CPT | Performed by: ADVANCED PRACTICE MIDWIFE

## 2025-01-07 LAB
AFP MOM: 1.06
AFP VALUE: 52 NG/ML
GA ON COLL DATE: 20.7 WEEKS
INSULIN DEP AFP: NO
MAT AGE AT EDD: 34.4 YR
MULTIPLE GEST AFP: NO
OSBR RISK 1 IN AFP: NORMAL
WEIGHT AFP: 214 LBS

## 2025-01-27 ENCOUNTER — ROUTINE PRENATAL (OUTPATIENT)
Dept: OBGYN CLINIC | Facility: CLINIC | Age: 35
End: 2025-01-27

## 2025-01-27 VITALS
DIASTOLIC BLOOD PRESSURE: 65 MMHG | SYSTOLIC BLOOD PRESSURE: 90 MMHG | WEIGHT: 219 LBS | HEART RATE: 81 BPM | BODY MASS INDEX: 34 KG/M2

## 2025-01-27 DIAGNOSIS — Z34.92 PRENATAL CARE IN SECOND TRIMESTER (HCC): Primary | ICD-10-CM

## 2025-01-27 DIAGNOSIS — Z98.891 PREVIOUS CESAREAN SECTION: ICD-10-CM

## 2025-01-27 PROBLEM — O34.219: Status: ACTIVE | Noted: 2025-01-27

## 2025-01-27 PROCEDURE — 99213 OFFICE O/P EST LOW 20 MIN: CPT | Performed by: ADVANCED PRACTICE MIDWIFE

## 2025-01-27 RX ORDER — TERCONAZOLE 4 MG/G
1 CREAM VAGINAL NIGHTLY
Qty: 45 G | Refills: 0 | Status: SHIPPED | OUTPATIENT
Start: 2025-01-27 | End: 2025-02-03

## 2025-01-27 NOTE — PROGRESS NOTES
Here for JAZZMINE visit.      Patient's last menstrual period was 2024 (exact date). 2025, by Last Menstrual Period 24w1d     Baby active. Denies contractions, LOF or bleeding. Has still been having vaginal itching and discharge. Did not complete 7 day course of Clotrimazole; desires a prescription treatment for vaginal yeast.     Labs: 3rd tri labs ordered  Ultrasound: -  4/7 wks anterior placenta with 3VC, normal anatomy    Had normal NT & low risk Mayer test  AFP negative  We discussed her hx of third degree laceration and risk for potential repeat of third or fourth degree with the risk of injury or incontinence of bowel and bladder. She has had a previous  and offered repeat c/s vs . She desires repeat c/s  but would like to discuss in depth again next visit with  present so he understands the risk to her as well.       ICD-10-CM    1. Prenatal care in second trimester ([facility])  Z34.92       2. Previous  section  Z98.891       3. Third degree laceration of perineum during delivery, postpartum ([facility])  O70.20         -Reviewed prenatal schedule  -Discuss possible scheduled  next visit. We discussed option of SHANTAL now vs staying with CNM's and scheduling c/s with MD's. She would like to discuss more with  present at [facility].   -Ordered 3rd trimester labs and plan to get drawn after 28 weeks gestation  -Ordered terconazole vaginal cream 7 days nightly  Fetal kick counts, warning signs and signs PTL reviewed.        My total time spent caring for the patient on the day of the encounter:  20 minutes, which included: chart review, exam, care coordination, charting, and counseling as per above.      RAEGAN Duque under direct supervision of Lisa Suazo CNM    I personally performed the patient's exam and medical decision making on this date of service.  I was physically present in the room for the performance of the E/M service.  I have reviewed  the TOMEKA student's documentation and findings including history, Exam, and Medical Decision Making, edited the document for accuracy and verify that it represents the clinical findings and services performed.  HAL Suazo CNM

## 2025-02-27 ENCOUNTER — LAB ENCOUNTER (OUTPATIENT)
Dept: LAB | Facility: HOSPITAL | Age: 35
End: 2025-02-27
Attending: ADVANCED PRACTICE MIDWIFE
Payer: MEDICAID

## 2025-02-27 ENCOUNTER — ROUTINE PRENATAL (OUTPATIENT)
Dept: OBGYN CLINIC | Facility: CLINIC | Age: 35
End: 2025-02-27

## 2025-02-27 VITALS
DIASTOLIC BLOOD PRESSURE: 72 MMHG | SYSTOLIC BLOOD PRESSURE: 105 MMHG | WEIGHT: 225 LBS | HEART RATE: 90 BPM | BODY MASS INDEX: 35 KG/M2

## 2025-02-27 DIAGNOSIS — N89.8 VAGINAL ITCHING: ICD-10-CM

## 2025-02-27 DIAGNOSIS — O99.210 OBESITY IN PREGNANCY (HCC): ICD-10-CM

## 2025-02-27 DIAGNOSIS — Z34.92 PRENATAL CARE IN SECOND TRIMESTER (HCC): ICD-10-CM

## 2025-02-27 DIAGNOSIS — Z87.59 HISTORY OF THIRD DEGREE PERINEAL LACERATION: ICD-10-CM

## 2025-02-27 DIAGNOSIS — Z34.83 PRENATAL CARE, SUBSEQUENT PREGNANCY IN THIRD TRIMESTER (HCC): Primary | ICD-10-CM

## 2025-02-27 DIAGNOSIS — Z98.891 HISTORY OF CESAREAN DELIVERY: ICD-10-CM

## 2025-02-27 LAB
DEPRECATED RDW RBC AUTO: 38.1 FL (ref 35.1–46.3)
ERYTHROCYTE [DISTWIDTH] IN BLOOD BY AUTOMATED COUNT: 14.6 % (ref 11–15)
GLUCOSE 1H P GLC SERPL-MCNC: 101 MG/DL
HCT VFR BLD AUTO: 33.8 %
HGB BLD-MCNC: 10.8 G/DL
MCH RBC QN AUTO: 23.8 PG (ref 26–34)
MCHC RBC AUTO-ENTMCNC: 32 G/DL (ref 31–37)
MCV RBC AUTO: 74.6 FL
PLATELET # BLD AUTO: 243 10(3)UL (ref 150–450)
RBC # BLD AUTO: 4.53 X10(6)UL
T PALLIDUM AB SER QL IA: NONREACTIVE
WBC # BLD AUTO: 10.8 X10(3) UL (ref 4–11)

## 2025-02-27 PROCEDURE — 86780 TREPONEMA PALLIDUM: CPT

## 2025-02-27 PROCEDURE — 82950 GLUCOSE TEST: CPT

## 2025-02-27 PROCEDURE — 85027 COMPLETE CBC AUTOMATED: CPT

## 2025-02-27 PROCEDURE — 36415 COLL VENOUS BLD VENIPUNCTURE: CPT

## 2025-02-27 PROCEDURE — 87389 HIV-1 AG W/HIV-1&-2 AB AG IA: CPT

## 2025-02-27 PROCEDURE — 99212 OFFICE O/P EST SF 10 MIN: CPT | Performed by: ADVANCED PRACTICE MIDWIFE

## 2025-02-27 NOTE — PROGRESS NOTES
Melia presents with her  for JAZZMINE visit. Feeling well. Endorses regular fetal movement. Denies contractions, LOF, vaginal bleeding.     Needs to complete 3rd trimester labs. Will complete after visit.    Discussed vaginal delivery versus repeat . Informed her that there is a 3-5% chance of repeat 3rd or 4th degree laceration. Reviewed risks and benefits with vaginal and  delivery. Informed her that she can make this decision at any time. Desires growth ultrasound since her son was > 9# to help in making decision.    She states she continues to have vaginal itching. Has been treated with terconazole and clotrimazole during this pregnancy. Both times yeast symptoms did not resolve. Vikor swab collected. Copious thick, yellow discharge present.     Patient also has itchy rash on upper arms and chest. No changes in soaps or detergents. Recommend trying hydrocortisone cream. If no improvement try benadryl.     Plan:  Complete 3rd trimester labs  JAZZMINE 2 weeks    Reviewed third trimester warning signs and when to call.

## 2025-03-03 ENCOUNTER — TELEPHONE (OUTPATIENT)
Dept: OBGYN CLINIC | Facility: CLINIC | Age: 35
End: 2025-03-03

## 2025-03-03 RX ORDER — FERROUS SULFATE 325(65) MG
325 TABLET ORAL EVERY OTHER DAY
Qty: 30 TABLET | Refills: 2 | Status: SHIPPED | OUTPATIENT
Start: 2025-03-03

## 2025-03-17 ENCOUNTER — ROUTINE PRENATAL (OUTPATIENT)
Dept: OBGYN CLINIC | Facility: CLINIC | Age: 35
End: 2025-03-17

## 2025-03-17 VITALS
DIASTOLIC BLOOD PRESSURE: 74 MMHG | WEIGHT: 225 LBS | BODY MASS INDEX: 35 KG/M2 | HEART RATE: 93 BPM | SYSTOLIC BLOOD PRESSURE: 106 MMHG

## 2025-03-17 DIAGNOSIS — Z34.93 PRENATAL CARE IN THIRD TRIMESTER (HCC): Primary | ICD-10-CM

## 2025-03-17 DIAGNOSIS — O34.219 PREVIOUS CESAREAN DELIVERY AFFECTING PREGNANCY (HCC): ICD-10-CM

## 2025-03-17 PROCEDURE — 90471 IMMUNIZATION ADMIN: CPT | Performed by: ADVANCED PRACTICE MIDWIFE

## 2025-03-17 PROCEDURE — 99213 OFFICE O/P EST LOW 20 MIN: CPT | Performed by: ADVANCED PRACTICE MIDWIFE

## 2025-03-17 PROCEDURE — 90715 TDAP VACCINE 7 YRS/> IM: CPT | Performed by: ADVANCED PRACTICE MIDWIFE

## 2025-03-17 RX ORDER — METRONIDAZOLE 7.5 MG/G
1 GEL VAGINAL NIGHTLY
Qty: 70 G | Refills: 0 | Status: SHIPPED | OUTPATIENT
Start: 2025-03-17 | End: 2025-03-22

## 2025-03-17 NOTE — PROGRESS NOTES
Subjective  Melia Mendoza is a 34 year old F  currently 31w1d d/b LMP c/w 8 week ultrasound who presents for JAZZMINE.    Feeling well today. Reports good fetal movement. Denies vaginal bleeding, loss of fluid, and uterine contractions.   Continues to have vaginal discharge. Started taking oral iron 5 days ago.  Desires Tdap today.     Objective  Vitals:    25 1021   BP: 106/74   Pulse: 93     Assessment/Plan  Melia Mendoza is a 34 year old F  currently 31w1d  Patient Active Problem List   Diagnosis    Previous  section    Obesity in pregnancy ([facility])    Preferred language is Indonesian    History of macrosomia in infant in prior pregnancy, currently pregnant ([facility])     (vaginal birth after ) ([facility])    Third degree laceration of perineum during delivery, postpartum ([facility])    Child with Down syndrome    Declines vaginal birth after  trial ([facility])     -Reviewed recommendations for Tdap vaccination in pregnancy; Tdap given today  -MD consult for repeat C section  -Reviewed vikor vaginal swab results of BV Rx Metronidazole 0.75% gel vaginally for 5 nights sent   -Danger sxs reviewed and when to call midwife    RTC 2 weeks    RAEGAN Duque under direct supervision of Lisa Suazo CNM      I personally performed the patient's exam and medical decision making on this date of service.  I was physically present in the room for the performance of the E/M service.  I have reviewed the TOMEKA student's documentation and findings including history, Exam, and Medical Decision Making, edited the document for accuracy and verify that it represents the clinical findings and services performed.  HAL Suazo CNM     I personally performed the patient's exam and medical decision making on this date of service.  I was physically present in the room for the performance of the E/M service.  I have reviewed the TOMEKA student's documentation and findings including history, Exam, and Medical Decision  Making, edited the document for accuracy and verify that it represents the clinical findings and services performed.  HAL Suazo CNM

## 2025-03-24 ENCOUNTER — TELEPHONE (OUTPATIENT)
Dept: OBGYN CLINIC | Facility: CLINIC | Age: 35
End: 2025-03-24

## 2025-03-25 ENCOUNTER — HOSPITAL ENCOUNTER (OUTPATIENT)
Dept: PERINATAL CARE | Facility: HOSPITAL | Age: 35
Discharge: HOME | End: 2025-03-25
Attending: OBSTETRICS & GYNECOLOGY
Payer: MEDICAID

## 2025-03-25 VITALS
DIASTOLIC BLOOD PRESSURE: 72 MMHG | SYSTOLIC BLOOD PRESSURE: 103 MMHG | HEART RATE: 82 BPM | WEIGHT: 225 LBS | BODY MASS INDEX: 35 KG/M2

## 2025-03-25 DIAGNOSIS — O99.210 OBESITY IN PREGNANCY (HCC): ICD-10-CM

## 2025-03-25 DIAGNOSIS — O09.299: ICD-10-CM

## 2025-03-25 DIAGNOSIS — O99.210 OBESITY IN PREGNANCY (HCC): Primary | ICD-10-CM

## 2025-03-25 PROCEDURE — 76819 FETAL BIOPHYS PROFIL W/O NST: CPT

## 2025-03-25 PROCEDURE — 76816 OB US FOLLOW-UP PER FETUS: CPT | Performed by: OBSTETRICS & GYNECOLOGY

## 2025-03-25 NOTE — PROGRESS NOTES
Outpatient Maternal-Fetal Medicine Consultation    Dear Ms. Cardona    Thank you for requesting ultrasound evaluation and maternal fetal medicine consultation on your patient Melia Mendoza.  As you are aware she is a 34 year old female  with a jackson pregnancy and an Estimated Date of Delivery: 25.  A maternal-fetal medicine consultation was requested secondary to prior child with trisomy 21.  Her prenatal records and labs were reviewed.    She passed this pregnancy.  She did not have any genetic screening last time.  Her daughter was born with Down syndrome.  She states it is a full extra chromosome 21.  ROS    HISTORY  OB History    Para Term  AB Living   3 2 2 0 0 2   SAB IAB Ectopic Multiple Live Births   0 0 0 0 2      # Outcome Date GA Lbr Terrell/2nd Weight Sex Type Anes PTL Lv   3 Current            2 Term 23 40w0d 09:09  00:54 8 lb 3.2 oz (3.72 kg) F VAGINAL SHELLEY EPI N GHISLAINE   1 Term 10/30/20 41w1d  9 lb 8.7 oz (4.33 kg) M CS-LTranv EPI  GHISLAINE      Complications: Failure to Progress in Second Stage       Allergies:  Allergies[1]   Current Meds:  Current Outpatient Medications   Medication Sig Dispense Refill    Ferrous Sulfate 325 (65 Fe) MG Oral Tab Take 1 tablet (325 mg total) by mouth every other day. 30 tablet 2    folic acid 800 MCG Oral Tab Take 1 tablet (800 mcg total) by mouth daily.      docusate sodium 100 MG Oral Tab Take 1 tablet (100 mg total) by mouth 2 (two) times daily. 60 tablet 3        HISTORY:  Past Medical History:    Cold sore          Past Surgical History:   Procedure Laterality Date              Tonsillectomy      as a child      No family history on file.   Social History     Socioeconomic History    Marital status:     Number of children: 1    Years of education: 18    Highest education level: Master's degree (e.g., MA, MS, [place], MEd, MSW, DANGELO)   Tobacco Use    Smoking status: Never     Passive exposure: Never    Smokeless tobacco:  Never   Vaping Use    Vaping status: Never Used   Substance and Sexual Activity    Alcohol use: Never    Drug use: Never    Sexual activity: Yes     Partners: Male   Other Topics Concern    Blood Transfusions Yes    Caffeine Concern Yes    Special Diet No    Exercise No    Seat Belt Yes     Social Drivers of Health     Food Insecurity: No Food Insecurity (10/25/2024)    Food Insecurity     Food Insecurity: Never true   Transportation Needs: No Transportation Needs (10/25/2024)    Transportation Needs     Lack of Transportation: No   Stress: No Stress Concern Present (10/25/2024)    Stress     Feeling of Stress : No   Housing Stability: Low Risk  (10/25/2024)    Housing Stability     Housing Instability: No          PHYSICAL EXAMINATION:  /72   Pulse 82   Wt 225 lb (102.1 kg)   LMP 2024 (Exact Date)   BMI 35.24 kg/m²   Physical Exam  Constitutional:       Appearance: Normal appearance.   Abdominal:      Palpations: Abdomen is soft.      Tenderness: There is no abdominal tenderness.   Neurological:      Mental Status: She is alert.   Psychiatric:         Mood and Affect: Mood normal.         Behavior: Behavior normal.           DISCUSSION  During her visit we discussed and reviewed the following issues:      When there has been a pergnancy affected by triomy 21, there is an increased risk of trisomy 21 until they are approximately 40.  That risk is 1% with each pregnancy until age 40 when it becomes between 1 to 2%.  We discussed that 75% of babies with Down syndrome rolling normal on ultrasound.  Her daughter is receiving early childhood intervention    .OBESITY:  Her BMI prior to pregnancy was 33  Obesity during pregnancy is associated with numerous maternal and  risks.  It is not clear whether obesity is a direct cause of adverse pregnancy outcome or whether the association between obesity and adverse pregnancy outcome is due to factors such as diabetes mellitus.   Data suggest that obese  women should be encouraged to undertake a weight reduction program (diet, exercise, behavior modification, and possibly bariatric surgery in some cases) prior to attempting to conceive.                  OB ULTRASOUND REPORT   See imaging tab for complete ultrasound report or in PACS    Single IUP in cephalic presentation.    Placenta is anterior.   A 3 vessel cord is noted.  Cardiac activity is present at 124 bpm  EFW 2507 g ( 5 lb 8 oz); 81%.    NANCY is  24.3 cm.  MVP is 7 cm      BIOPHYSICAL PROFILE:  Movement:    2/2  Tone:            2/2  Breathin/2  Fluid:             2/2  TOTAL:               MACROSOMIA:  I discussed the risks of macrosomia including maternal and fetal risks.  We discussed shoulder dystocias, brachial plexus injury, fractures and death.  I informed her, she is at risk for vaginal lacerations that can extend to the bladder or rectum.  We also discussed the risks of  section and the increased chance of having one performed due to macrosomia.  She understands that the ultrasound weight is an estimate and can have about a 20% error.       IMPRESSION:   1. IUP @  32w2d   2. Scan consistent with dates  3. No fetal structural abnormalities seen  4. Daughter with trisomy 21  5.  Obesity    RECOMMENDATIONS:     Weekly NSTs at 36 weeks  Growth  US in 4 wks      Thank you for allowing me to participate in the care of your patient.  Please do not hesitate to contact me if additional questions or concerns arise.      Brian Noguera D.O.  Maternal Fetal Medicine     25 minutes spent in review of records, patient consultation, documentation and coordination of care.  The relevant clinical matter(s) are summarized above.     Note to patient and family  The 21st Century Cures Act makes medical notes available to patients in the interest of transparency.  However, please be advised that this is a medical document.  It is intended as oush-hk-gaec communication.  It is written and medical  language may contain abbreviations or verbiage that are technical and unfamiliar.  It may appear blunt or direct.  Medical documents are intended to carry relevant information, facts as evident, and the clinical opinion of the practitioner.       [1]   Allergies  Allergen Reactions    Ibuprofen NAUSEA AND VOMITING    Pork Derived Products OTHER (SEE COMMENTS)     [employer]

## 2025-04-04 ENCOUNTER — TELEPHONE (OUTPATIENT)
Dept: OBGYN CLINIC | Facility: CLINIC | Age: 35
End: 2025-04-04

## 2025-04-04 ENCOUNTER — ROUTINE PRENATAL (OUTPATIENT)
Dept: OBGYN CLINIC | Facility: CLINIC | Age: 35
End: 2025-04-04
Payer: MEDICAID

## 2025-04-04 VITALS
WEIGHT: 227.63 LBS | HEIGHT: 67 IN | DIASTOLIC BLOOD PRESSURE: 73 MMHG | BODY MASS INDEX: 35.73 KG/M2 | HEART RATE: 99 BPM | SYSTOLIC BLOOD PRESSURE: 103 MMHG

## 2025-04-04 DIAGNOSIS — Z34.83 PRENATAL CARE, SUBSEQUENT PREGNANCY IN THIRD TRIMESTER (HCC): Primary | ICD-10-CM

## 2025-04-04 PROCEDURE — 99212 OFFICE O/P EST SF 10 MIN: CPT | Performed by: ADVANCED PRACTICE MIDWIFE

## 2025-04-04 NOTE — PATIENT INSTRUCTIONS
Adapting to Pregnancy: Third Trimester    Although common during pregnancy, some discomforts may seem worse in the final weeks. Simple lifestyle changes can help. Take care of yourself. And ask your partner to help out with small tasks.  Limiting leg problems  Ways to combat leg issues:  Wear support hose all day.  Avoid snug shoes and clothes that bind, like tight pants and socks with elastic tops.  Sit with your feet and legs raised often.  Caring for your breasts  Tips to follow include:  Wash with plain water. Avoid using harsh soaps or rubbing alcohol. They may cause dryness.  Wear a nursing bra for extra support. It can also hide any leaks from your nipples.  Controlling hemorrhoids  Ways to avoid hemorrhoids include:  Eat foods that are high in fiber. Also, exercise and drink enough fluids. This will reduce constipation and hemorrhoids.  Sleep and nap on your side. This limits pressure on the veins of your rectum.  Try not to stand or sit for long periods.  Controlling back pain  As your body changes during pregnancy, your back must work in new ways. Back pain is due to many causes. Physical changes in your body can strain your back and its supporting muscles. Also, hormones (chemicals that carry messages throughout the body) increase during pregnancy. This can affect how your muscles and joints work together. All of these changes can lead to pain. Pain may be felt in the upper or lower back. Pain is also common in the pelvis. Some pregnant women have sciatica. This is pain caused by pressure on the sciatic nerve running down the back of the leg. Ask your healthcare provider for specific tips and exercises to help control your back pain.  Tips to help you rest  Good rest and sleep will help you feel better. Here are some ideas:  Ask your partner to massage your shoulders, neck, or back.  Limit the errands you do each day.  Lie down in the afternoon or after work for a few minutes.  Take a warm bath before  you go to sleep.  Drink warm milk or teas without caffeine.  Avoid coffee, black tea, and cola.  Stopping heartburn  Avoid spicy, greasy, fried, or acidic foods.  Eat small amounts more often. Eat slowly. Wait 2 hours after eating before lying down.  Sleep with your upper body raised 6 inches.   Managing mood swings  Ways to manage mood swings include:  Know that mood changes are normal.  Exercise often, but get plenty of rest.  Address any concerns and limit stress. Talking to your partner, other women, or your healthcare provider may help.  Dealing with urinary frequency  Tips to deal with having to urinate often include:  Drink plenty of water all day. If you drink a lot in the evening, though, you may have to get up more in the night.  Limit coffee, black tea, and cola.  [employer] last reviewed this educational content on 2/1/2018 © 2000-2020 The [employer]. [address] [place] [address]. All rights reserved. This information is not intended as a substitute for professional medical care. Always follow your healthcare professional's instructions.        Pregnancy: Your Third Trimester Changes  As the baby grows, your body changes too. You may also see signs that your body is getting ready for labor. Be patient. Within a few more weeks, your baby will be born.  How you are changing  Your body is preparing for the birth of your baby. Some of the most common changes are listed below. If you have any questions or concerns, ask your healthcare provider:  You’ll gain more weight from fluids, extra blood, and fat deposits.  Your breasts will grow as your body gets ready to feed the baby. They may be more tender. You may also notice a slight yellow or white discharge from the nipples.  Discharge from your vagina may increase. This is normal.  You might see some skin color changes on your forehead, cheeks, or nose. Most of these will go away after you deliver.  How your baby is growing       Month  7  Your baby can open and close his or her eyes and weighs around 4 pounds. If born prematurely (too early), your baby would likely survive with special care. Month 8  Your baby is building up body fat and weighs around 6 pounds. Month 9  Your baby weighs nearly 7 pounds and is about 19 to 21 inches long. In other words, any day now...   [employer] last reviewed this educational content on 2018 The [employer]. [address] [place] [address], [place] [address]. All rights reserved. This information is not intended as a substitute for professional medical care. Always follow your healthcare professional's instructions.   Understanding  Labor  Going into labor before your 37th week of pregnancy is called  labor.  labor can cause your baby to be born too soon. This can lead to a number of health problems that may affect your baby.      Before labor, the cervix is thick and closed.      In  labor, the cervix begins to efface (thin) and dilate (open).   Symptoms of  labor   If you think you’re having  labor, get medical help right away. Contractions alone don’t mean you’re in  labor. What matters more are changes in your cervix (the lower end of the uterus). Symptoms of  labor include:   Four or more contractions per hour  Strong contractions  Constant menstrual-like cramping  Low-back pain  Mucous or bloody vaginal discharge  Bleeding or spotting in the second or third trimester  Evaluating  labor   Your healthcare provider will try to find out whether you’re in  labor or whether you’re just having contractions. He or she may watch you for a few hours. The following tests may be done:   Pelvic exam to see if your cervix has effaced (thinned) and dilated (opened)  Uterine activity monitoring to detect contractions  Fetal monitoring to check the health of your baby  Ultrasound to check your baby’s size and position  Amniocentesis to  check how mature your baby’s lungs are  Caring for yourself at home   If you have  contractions, but your cervix is still thick and closed, your healthcare provider may ask you to do the following at home:   Drink plenty of water.  Do fewer activities.  Rest in bed on your side.  Don't have intercourse or nipple stimulation.  When to call your healthcare provider   Call your healthcare provider if you notice any of these:   Four or more contractions per hour  Bag of water breaks  Bleeding or spotting  If you need hospital care    labor often requires that you have hospital care and complete bed rest. You may have an IV (intravenous) line to get fluids. You may be given pills or injections to help prevent contractions. Finally, you may get medicine (corticosteroids) that helps your baby’s lungs mature more quickly.   Are you at risk?   Any pregnant woman can have  labor. It may start for no reason. But these risk factors can increase your chances:   Past  labor or past early birth  Smoking, drug, or alcohol use during pregnancy  Multiple fetuses (twins or more)  Problems with the shape of the uterus  Bleeding during the pregnancy  The dangers of  birth   A baby born too soon may have health problems. This is because the baby didn’t have enough time to mature. Some of the risks for your baby include:   Not breastfeeding or feeding well  Having immature lungs  Bleeding in the brain  Dying  Reaching term   Your goal is to get as close to term as you can before giving birth. The closer you get to term, the higher your chance of having a healthy baby. Work with your healthcare provider. Together, you can take steps that may keep you from giving birth too early.   [employer] last reviewed this educational content on 3/1/2019  © 5541-3802 [employer] [address] [place] [address], [place] [address]. All rights reserved. This information is not intended as a substitute for  professional medical care. Always follow your healthcare professional's instructions.        Premature Labor    Premature labor ( labor) is when symptoms of labor occur before 37 weeks of pregnancy. (This is 3 weeks before your due date.) Premature labor can lead to premature delivery. This means giving birth to your baby early. Babies need at least 37 weeks of pregnancy for all the organs to develop normally. The earlier the delivery, the greater the risks to the baby.  In most cases, the cause of premature labor is unknown. But certain factors may make the problem more likely. These include:  History of premature labor with other pregnancies  Smoking  Alcohol or substance abuse  Low pre-pregnancy weight or weight gain during pregnancy  Short time period between pregnancies  Being pregnant with twins, triplets, or more  History of certain types of surgery on the cervix or uterus  Having a short cervix  Certain infections  There are a number of other risk factors. Ask your healthcare provider to help you understand the risk factors specific to your case. Then find out what you can do to control or reduce them.  Contractions are one of the main signs of premature labor. A contraction is different from cramping. It may feel painful and the belly (abdomen) may get hard. It can last from a few seconds to a few minutes. Some women may feel only a sense of pressure in the belly, thighs, rectum, or vagina. Some may feel only the hardening of the uterus without pain or pressure. Or there may be a constant pain in the lower back, which spreads forward toward the belly.Premature labor is often treated with medicines. A hospital stay may be needed. If labor doesn't progress and you and your baby are both healthy, you may be discharged to continue care at home.  Home care  Ask your provider any questions you have. Be certain you understand how to care for yourself at home. Also follow all recommendations given by your  healthcare providers.  Learn the signs of premature labor. Watch for these signs when you get home.  Limit or restrict activities as advised. This may include stopping certain physical activities and cutting back hours at work.  Avoid doing strenuous work as directed by your provider. Ask family and friends for help with tasks and support at home, if needed.  Don’t smoke, drink alcohol, or use other harmful substances.  Take steps to reduce stress.  Report any unusual symptoms to your provider.    Follow-up care  Follow up with your healthcare provider, or as directed. Weekly visits with your provider may be needed.  When to seek medical advice  Call your healthcare provider right away if any of these occur:  Regular or frequent contractions, whether they are painful or not  Pressure in the pelvis  Pressure in the lower belly or mild cramping in your belly with or without diarrhea  Constant low, dull backache  Gush or slow leaking of water from your vagina  Change in vaginal discharge (watery, mucus, or bloody)  Any vaginal bleeding  Decreased movement of your baby  [employer] last reviewed this educational content on 6/1/2018 © 2000-2020 The [employer]. [address] [place], [place] [id number]. All rights reserved. This information is not intended as a substitute for professional medical care. Always follow your healthcare professional's instructions.        Understanding Preeclampsia  Preeclampsia is high blood pressure (hypertension) that happens during pregnancy. It often shows up around the 20th week of pregnancy. It often goes back to normal by the 12th week after you give birth. It can lead to serious health risks for you and your baby. During your pregnancy, your healthcare provider will watch your blood pressure.    Symptoms  A common symptom of preeclampsia is high blood pressure. Other symptoms may include:  Rapid weight gain  Protein in your urine  Headache  Belly (abdominal) pain on your  right side  Vision problems. These include flashes or spots.  Swelling (edema) in your face or hands. This also often happens near the end of normal pregnancies, even without preeclampsia.  Tests you may have  Your healthcare provider will want to check your blood pressure throughout your pregnancy. If your blood pressure is high, you may have the following tests:  Urine tests to look for protein  Blood tests to confirm preeclampsia  Fetal monitoring to make sure that your baby is healthy  Treating preeclampsia  You may need to take a daily low dose of aspirin if you are at risk for preeclampsia. Preeclampsia almost always ends soon after you give birth. Until then, your healthcare provider can help manage your condition. If your symptoms are mild, you may need activity limits at home, including bed rest and no heavy lifting. If your symptoms are severe, you will stay in the hospital. Hospital treatment includes:  Activity limits to help control blood pressure. This means no heavy lifting and 8 hours per day lying down with the feet up.  Magnesium IV (intravenous) drip during labor to prevent seizures  Induced labor or surgical delivery by  section. Delivery is considered the cure for preeclampsia.  When to call your healthcare provider  Call your healthcare provider if swelling, weight gain, or other symptoms come on quickly or are severe. Some cases of preeclampsia are more severe than others. Your symptoms also may change or get worse as you get closer to your due date.  Who’s at risk?  No one knows what causes preeclampsia. Preeclampsia can happen in any pregnant woman. But it is more common in first-time pregnancies. Things that increase the risk include:  Previous pregnancies. You are at risk if you had preeclampsia, intrauterine growth retardation (IUGR),  birth, placental abruption, or fetal death in a past pregnancy.  Health history of mother. You are at risk if you have diabetes, high blood  pressure, obesity, kidney disease, autoimmune disease such as lupus, or a family history of preeclampsia.  Current pregnancy. You are at risk if this is your first pregnancy, or if you have multiple fetuses, are younger than age 18 or older than 40, or used in vitro fertilization.  Race. You are at risk if you are black.  Dangers of preeclampsia  If not treated, preeclampsia can cause problems for you and your baby. The placenta is the organ that nourishes your baby. It may tear away from the uterine wall. This can put the baby at risk for health problems (fetal distress) and premature birth. Preeclampsia can also cause these health problems:  Kidney failure or other organ damage  Seizures  Stroke  Once you give birth  In most cases, preeclampsia goes away on its own soon after you give birth. This is often by the 12th week after you give deliver. Within days of delivery, your blood pressure, swelling, and other symptoms should get better. For some women, problems from preeclampsia can continue after delivery.  Postpartum preeclampsia that develops within the first 48 hours after delivery is rare. Another type of postpartum preeclampsia that develops more than 48 hours after delivery is called late-onset preeclampsia. It is also rare. Contact your healthcare provider right away if you have symptoms of preeclampsia after you deliver.  [employer] last reviewed this educational content on 12/1/2019  © 2225-1073 The [employer]. [address] [place] [address], [place] [address]. All rights reserved. This information is not intended as a substitute for professional medical care. Always follow your healthcare professional's instructions.        Kick Counts    It’s normal to worry about your baby’s health. One way you can know your baby’s doing well is to record the baby’s movements once a day. This is called a kick count. Remember to take your kick count records to all your appointments with your healthcare provider.  How  to count kicks  Time how long it takes you to feel 10 kicks, flutters, swishes, or rolls. Ideally, you want to feel at least 10 movements within 2 hours. You will likely feel 10 movements in less time than that.  Starting at 28 weeks, count your baby's movements daily. Follow your healthcare provider's instructions for kick counting. Here are tips for counting kicks:  Choose a time when the baby is active, such as after a meal.   Sit comfortably or lie on your side.   The first time the baby moves, write down the time.   Count each movement until the baby has moved 10 times. This can take from 20 minutes to 2 hours.   If you have not felt 10 kicks by the end of the second hour, wait a few hours. Then try again.  Try to do it at the same time each day.  When to call your healthcare provider  Call your healthcare provider right away if:  You do a couple sets of kick counts during the day and your baby moves fewer than 10 times in 2 hours  Your baby moves much less often than on the days before.  You have not felt your baby move all day.  [employer] last reviewed this educational content on 12/1/2017  © 2175-4712 The [employer]. [address] [place] [address], [place] [address]. All rights reserved. This information is not intended as a substitute for professional medical care. Always follow your healthcare professional's instructions.

## 2025-04-04 NOTE — PROGRESS NOTES
Melia, , is at 33w5d, here for her JAZZMINE visit.  Currently, she is feeling well. Denies 3rd trimester danger signs.   Has repeat C/S consult scheduled for  with Dr Rey.    Vital signs and weight reviewed  See flowsheets    Assessment/Plan: Care is up to date  Next visit: 2 weeks    Reviewed:   Prenatal visit schedule   labor precautions  Kick counts  Danger signs    Pt verbalized understanding. All questions answered. No barriers to learning identified

## 2025-04-12 ENCOUNTER — ROUTINE PRENATAL (OUTPATIENT)
Dept: OBGYN CLINIC | Facility: CLINIC | Age: 35
End: 2025-04-12
Payer: MEDICAID

## 2025-04-12 VITALS
DIASTOLIC BLOOD PRESSURE: 72 MMHG | BODY MASS INDEX: 35.75 KG/M2 | WEIGHT: 227.81 LBS | SYSTOLIC BLOOD PRESSURE: 102 MMHG | HEIGHT: 67 IN

## 2025-04-12 DIAGNOSIS — O09.93 SUPERVISION OF HIGH RISK PREGNANCY IN THIRD TRIMESTER (HCC): Primary | ICD-10-CM

## 2025-04-12 DIAGNOSIS — N89.8 VAGINAL DISCHARGE: ICD-10-CM

## 2025-04-12 PROCEDURE — 99214 OFFICE O/P EST MOD 30 MIN: CPT | Performed by: OBSTETRICS & GYNECOLOGY

## 2025-04-12 PROCEDURE — 81514 NFCT DS BV&VAGINITIS DNA ALG: CPT | Performed by: OBSTETRICS & GYNECOLOGY

## 2025-04-14 ENCOUNTER — TELEPHONE (OUTPATIENT)
Dept: OBGYN CLINIC | Facility: CLINIC | Age: 35
End: 2025-04-14

## 2025-04-14 DIAGNOSIS — Z01.818 PREOPERATIVE TESTING: Primary | ICD-10-CM

## 2025-04-14 NOTE — TELEPHONE ENCOUNTER
7:30am  ----- Message from Jersey Rey sent at 2025 11:13 AM CDT -----  Regarding: Please schedule RCS  Please schedule the following surgery:Procedure: repeat  section Assist: Yes- Usual peopleDate:   25 or 25                                 Time Requested: 0730Dx: history of  section and history of 3rd degree laceration. Pre-op appt: N/a Admission type: InDepartment of discharge: FloorExpected length of stay: 2-3 days Procedure length time (please enter amount you are requesting): 1 hours Recovery time: 8 weeksMedical Clearance: NoPost- Op f/u appt time frame: 2 weeks ALL Medicaid/including BCBS community: Tubal/ Hyst form MUST be signed (30 days): N/a. Declined tubal. Message to nurses: None Thank you. Dr. Rey

## 2025-04-16 NOTE — PROGRESS NOTES
Outpatient Maternal-Fetal Medicine Follow-Up    Dear Ms. Cardona    Thank you for requesting an ultrasound and maternal-fetal medicine consultation on your patient Melia Mendoza.  As you are aware she is a 34 year old female  with a jackson pregnancy and an Estimated Date of Delivery: 25.  She returned to maternal-fetal medicine today for a follow-up visit.  Her history was reviewed from her prior visit and there were no interval changes.    Antepartum Risk Factors  Obesity  Prior child with trisomy 21    PHYSICAL EXAMINATION:  LMP 2024 (Exact Date)   General: alert and oriented, no acute distress  Abdomen: gravid, soft, non-tender  Extremities: non-tender, no edema    OBSTETRIC ULTRASOUND  The patient had a follow-up growth ultrasound today which revealed normal interval fetal growth, BPP .    ***    See Imaging Tab For Complete Ultrasound Report  I interpreted the results and reviewed them with the patient.    DISCUSSION  During her visit we discussed and reviewed the following issues:  OBESITY  See prior MFM notes for a detailed review.    IMPRESSION:  IUP at 36w2d  Obesity  Prior child with trisomy 21    RECOMMENDATIONS:  Continue care with Ms. Cardona  Weekly NST's at 36 weeks    Thank you for allowing me to participate in the care of your patient.  Please do not hesitate to contact me if additional questions or concerns arise.      Marcus Messina M.D.    {Outpatient Visit Time (F/U):5868::\"20\"} minutes spent in review of records, patient consultation, documentation and coordination of care.  The relevant clinical matter(s) are summarized above.     Note to patient and family  The 21st Century Cures Act makes medical notes available to patients in the interest of transparency.  However, please be advised that this is a medical document.  It is intended as xctp-ve-fpan communication.  It is written and medical language may contain abbreviations or verbiage that are technical and unfamiliar.  It  may appear blunt or direct.  Medical documents are intended to carry relevant information, facts as evident, and the clinical opinion of the practitioner.

## 2025-04-18 ENCOUNTER — TELEPHONE (OUTPATIENT)
Dept: OBGYN CLINIC | Facility: CLINIC | Age: 35
End: 2025-04-18

## 2025-04-18 NOTE — TELEPHONE ENCOUNTER
Patient stating the pharmacy is not allowing her to refill her iron supplement.    Pls advise    Ferrous Sulfate 325 (65 Fe) MG Oral Tab 30 tablet 2 3/3/2025 --   Sig:   Take 1 tablet (325 mg total) by mouth every other day.     Route:   Oral     Order #:   [id number]

## 2025-04-21 NOTE — TELEPHONE ENCOUNTER
Name and  verified. RN explained that the prescription cannot be picked up until the  and that she should be taking the iron pill every other day. Patient states understanding.

## 2025-04-21 NOTE — TELEPHONE ENCOUNTER
Patient called back she states she was only give iron tablets for 15 days  ask that someone please give her a call back

## 2025-04-21 NOTE — TELEPHONE ENCOUNTER
This RN called the pharmacy and pharmacy stated she is trying to  her prescription too early. It will be available on the 25th. This RN left message to call back and will send a Hedvig Message with this information.

## 2025-04-22 ENCOUNTER — HOSPITAL ENCOUNTER (EMERGENCY)
Facility: HOSPITAL | Age: 35
Discharge: HOME | End: 2025-04-22
Attending: EMERGENCY MEDICINE
Payer: MEDICAID

## 2025-04-22 ENCOUNTER — HOSPITAL ENCOUNTER (OUTPATIENT)
Dept: PERINATAL CARE | Facility: HOSPITAL | Age: 35
Discharge: HOME | End: 2025-04-22
Attending: OBSTETRICS & GYNECOLOGY
Payer: MEDICAID

## 2025-04-22 ENCOUNTER — APPOINTMENT (OUTPATIENT)
Dept: PERINATAL CARE | Facility: HOSPITAL | Age: 35
End: 2025-04-22
Attending: OBSTETRICS & GYNECOLOGY
Payer: MEDICAID

## 2025-04-22 ENCOUNTER — ROUTINE PRENATAL (OUTPATIENT)
Dept: OBGYN CLINIC | Facility: CLINIC | Age: 35
End: 2025-04-22

## 2025-04-22 ENCOUNTER — APPOINTMENT (OUTPATIENT)
Dept: GENERAL RADIOLOGY | Facility: HOSPITAL | Age: 35
End: 2025-04-22
Attending: EMERGENCY MEDICINE
Payer: MEDICAID

## 2025-04-22 VITALS
WEIGHT: 225 LBS | DIASTOLIC BLOOD PRESSURE: 69 MMHG | TEMPERATURE: 98 F | HEART RATE: 84 BPM | HEIGHT: 67 IN | RESPIRATION RATE: 19 BRPM | OXYGEN SATURATION: 98 % | SYSTOLIC BLOOD PRESSURE: 99 MMHG | BODY MASS INDEX: 35.31 KG/M2

## 2025-04-22 VITALS
WEIGHT: 234 LBS | HEART RATE: 86 BPM | SYSTOLIC BLOOD PRESSURE: 108 MMHG | BODY MASS INDEX: 37 KG/M2 | DIASTOLIC BLOOD PRESSURE: 75 MMHG

## 2025-04-22 DIAGNOSIS — R06.02: ICD-10-CM

## 2025-04-22 DIAGNOSIS — R60.9 CHRONIC EDEMA: Primary | ICD-10-CM

## 2025-04-22 DIAGNOSIS — Z34.93 PRENATAL CARE, THIRD TRIMESTER (HCC): Primary | ICD-10-CM

## 2025-04-22 DIAGNOSIS — O12.03: ICD-10-CM

## 2025-04-22 DIAGNOSIS — O99.210 OBESITY IN PREGNANCY (HCC): ICD-10-CM

## 2025-04-22 DIAGNOSIS — O99.210 OBESITY IN PREGNANCY (HCC): Primary | ICD-10-CM

## 2025-04-22 DIAGNOSIS — O99.891: ICD-10-CM

## 2025-04-22 LAB
ALBUMIN SERPL-MCNC: 3.5 G/DL (ref 3.2–4.8)
ALP LIVER SERPL-CCNC: 135 U/L (ref 37–98)
ALT SERPL-CCNC: 13 U/L (ref 10–49)
ANION GAP SERPL CALC-SCNC: 8 MMOL/L (ref 0–18)
AST SERPL-CCNC: 15 U/L (ref ?–34)
BASOPHILS # BLD AUTO: 0.03 X10(3) UL (ref 0–0.2)
BASOPHILS NFR BLD AUTO: 0.2 %
BILIRUB DIRECT SERPL-MCNC: <0.1 MG/DL (ref ?–0.3)
BILIRUB SERPL-MCNC: 0.3 MG/DL (ref 0.3–1.2)
BUN BLD-MCNC: <5 MG/DL (ref 9–23)
CALCIUM BLD-MCNC: 8.4 MG/DL (ref 8.7–10.4)
CHLORIDE SERPL-SCNC: 109 MMOL/L (ref 98–112)
CO2 SERPL-SCNC: 20 MMOL/L (ref 21–32)
CREAT BLD-MCNC: 0.54 MG/DL (ref 0.55–1.02)
DEPRECATED RDW RBC AUTO: 51.1 FL (ref 35.1–46.3)
EGFRCR SERPLBLD CKD-EPI 2021: 124 ML/MIN/1.73M2 (ref 60–?)
EOSINOPHIL # BLD AUTO: 0.13 X10(3) UL (ref 0–0.7)
EOSINOPHIL NFR BLD AUTO: 1 %
ERYTHROCYTE [DISTWIDTH] IN BLOOD BY AUTOMATED COUNT: 18.6 % (ref 11–15)
GLUCOSE BLD-MCNC: 92 MG/DL (ref 70–99)
HCT VFR BLD AUTO: 36.3 % (ref 35–48)
HGB BLD-MCNC: 11.9 G/DL (ref 12–16)
IMM GRANULOCYTES # BLD AUTO: 0.05 X10(3) UL (ref 0–1)
IMM GRANULOCYTES NFR BLD: 0.4 %
LYMPHOCYTES # BLD AUTO: 2.34 X10(3) UL (ref 1–4)
LYMPHOCYTES NFR BLD AUTO: 18.2 %
MCH RBC QN AUTO: 24.7 PG (ref 26–34)
MCHC RBC AUTO-ENTMCNC: 32.8 G/DL (ref 31–37)
MCV RBC AUTO: 75.5 FL (ref 80–100)
MONOCYTES # BLD AUTO: 0.64 X10(3) UL (ref 0.1–1)
MONOCYTES NFR BLD AUTO: 5 %
NEUTROPHILS # BLD AUTO: 9.64 X10 (3) UL (ref 1.5–7.7)
NEUTROPHILS # BLD AUTO: 9.64 X10(3) UL (ref 1.5–7.7)
NEUTROPHILS NFR BLD AUTO: 75.2 %
NT-PROBNP SERPL-MCNC: 99 PG/ML (ref ?–125)
PLATELET # BLD AUTO: 283 10(3)UL (ref 150–450)
POTASSIUM SERPL-SCNC: 3.5 MMOL/L (ref 3.5–5.1)
PROT SERPL-MCNC: 6.1 G/DL (ref 5.7–8.2)
RBC # BLD AUTO: 4.81 X10(6)UL (ref 3.8–5.3)
SODIUM SERPL-SCNC: 137 MMOL/L (ref 136–145)
WBC # BLD AUTO: 12.8 X10(3) UL (ref 4–11)

## 2025-04-22 PROCEDURE — 83880 ASSAY OF NATRIURETIC PEPTIDE: CPT | Performed by: EMERGENCY MEDICINE

## 2025-04-22 PROCEDURE — 80076 HEPATIC FUNCTION PANEL: CPT | Performed by: EMERGENCY MEDICINE

## 2025-04-22 PROCEDURE — 71045 X-RAY EXAM CHEST 1 VIEW: CPT | Performed by: EMERGENCY MEDICINE

## 2025-04-22 PROCEDURE — 99284 EMERGENCY DEPT VISIT MOD MDM: CPT

## 2025-04-22 PROCEDURE — 93005 ELECTROCARDIOGRAM TRACING: CPT

## 2025-04-22 PROCEDURE — 80048 BASIC METABOLIC PNL TOTAL CA: CPT | Performed by: EMERGENCY MEDICINE

## 2025-04-22 PROCEDURE — 99213 OFFICE O/P EST LOW 20 MIN: CPT | Performed by: ADVANCED PRACTICE MIDWIFE

## 2025-04-22 PROCEDURE — 36415 COLL VENOUS BLD VENIPUNCTURE: CPT

## 2025-04-22 PROCEDURE — 85025 COMPLETE CBC W/AUTO DIFF WBC: CPT | Performed by: EMERGENCY MEDICINE

## 2025-04-22 PROCEDURE — 93010 ELECTROCARDIOGRAM REPORT: CPT

## 2025-04-22 NOTE — ED INITIAL ASSESSMENT (HPI)
[facility]/o shortness of breath for months. +ankles and feet swelling for two months. Pt is currently 36 weeks pregnant, denies any complications with her current pregnancy.

## 2025-04-22 NOTE — ED PROVIDER NOTES
Patient Seen in: [facility] Emergency Department    History     Chief Complaint   Patient presents with    Shortness Of Breath       HPI    34-year-old female who presents to the emergency department given 2 months of lower extremity edema which has essentially been the same as well as a trace amount of dyspnea which the patient states has been the same as always.  The  states that he has not noticed any evidence of respiratory failure or any dyspnea, believes that she has a large sigh whenever doing anything slightly physical, according to the .  No chest pain.  No fevers or chills    History reviewed. Past Medical History[1]    History reviewed. Past Surgical History[2]      Medications :  Prescriptions Prior to Admission[3]     Family History[4]    Smoking Status: Social Hx on file[5]    Constitutional and vital signs reviewed.      Social History and Family History elements reviewed from today, pertinent positives to the presenting problem noted.    Physical Exam     ED Triage Vitals   BP 04/22/25 1128 112/75   Pulse 04/22/25 1124 87   Resp 04/22/25 1124 20   Temp 04/22/25 1124 98 °F (36.7 °C)   Temp src 04/22/25 1124 Oral   SpO2 04/22/25 1124 98 %   O2 Device 04/22/25 1124 None (Room air)       All measures to prevent infection transmission during my interaction with the patient were taken. The patient was already wearing a droplet mask on my arrival to the room. Personal protective equipment was worn throughout the duration of the exam.  Handwashing was performed prior to and after the exam.  Stethoscope and any equipment used during my examination was cleaned with super sani-cloth germicidal wipes following the exam.     Physical Exam    General: NAD  Head: Normocephalic and atraumatic.  Mouth/Throat/Ears/Nose: Oropharynx is clear and moist.   Eyes: Conjunctivae and EOM are normal.   Neck: Normal range of motion. Supple.   Cardiovascular: Normal rate, regular rhythm, normal heart  sounds.  Respiratory/Chest: Clear and equal bilaterally. Exhibits no tenderness.  Gastrointestinal: Soft, non-tender, non-distended. Bowel sounds are normal.   Musculoskeletal:No swelling or deformity.   Neurological: Alert and appropriate. No focal deficits.   Skin: Skin is warm and dry. No pallor.  Psychiatric: Has a normal mood and affect.      ED Course        Labs Reviewed   BASIC METABOLIC PANEL (8) - Abnormal; Notable for the following components:       Result Value    CO2 20.0 (*)     BUN <5 (*)     Creatinine 0.54 (*)     Calcium, Total 8.4 (*)     All other components within normal limits   HEPATIC FUNCTION PANEL (7) - Abnormal; Notable for the following components:    Alkaline Phosphatase 135 (*)     All other components within normal limits   CBC WITH DIFFERENTIAL WITH PLATELET - Abnormal; Notable for the following components:    WBC 12.8 (*)     HGB 11.9 (*)     MCV 75.5 (*)     MCH 24.7 (*)     RDW-SD 51.1 (*)     RDW 18.6 (*)     Neutrophil Absolute Prelim 9.64 (*)     Neutrophil Absolute 9.64 (*)     All other components within normal limits   PRO BETA NATRIURETIC PEPTIDE - Normal     EKG    Rate, intervals and axes as noted on EKG Report.  Rate: 81  Rhythm: Sinus Rhythm  Reading: No STEMI.           As Interpreted by me    Imaging Results Available and Reviewed while in ED: XR CHEST AP PORTABLE  (CPT=71045)  Result Date: 4/22/2025  CONCLUSION: No acute cardiopulmonary process.    Dictated by (CST): Tyshawn Albright MD on 4/22/2025 at 1:59 PM     Finalized by (CST): Tyshawn Albright MD on 4/22/2025 at 2:00 PM          ED Medications Administered: Medications - No data to display      MDM     Vitals:    04/22/25 1124 04/22/25 1128 04/22/25 1430   BP:  112/75 99/69   Pulse: 87  84   Resp: 20  19   Temp: 98 °F (36.7 °C)     TempSrc: Oral     SpO2: 98%  98%   Weight: 102.1 kg     Height: 170.2 cm (5' 7\")       *I personally reviewed and interpreted all ED vitals.    Pulse Ox: 98%, Room air, Normal      Monitor Interpretation:   normal sinus rhythm as interpreted by me.  The cardiac monitor was ordered given concern for pulmonary edema      Medical Decision Making      Differential Diagnosis/ Diagnostic Considerations: lymphedema,     Complicating Factors: The patient already has does not have any pertinent problems on file. to contribute to the complexity of this ED evaluation.    I reviewed prior chart records including patient message communication from 2025.  Patient here with concern for chronic lower extremity edema, no pitting edema on my evaluation.  Clear lungs.  Chest x-ray without pulmonary edema.  Lab work including renal function and BNP unremarkable.  Advised to follow-up closely with OB team.    Dc blood pressures normal, no evidence of preeclampsia in stable condition.  Patient is comfortable with the plan.      Disposition and Plan     Clinical Impression:  1. Chronic edema        Disposition:  Discharge    Follow-up:  Dulce Maria Pack NP  [address] [facility] [address]  [facility] [id number] [phone]    Follow up      Jersey Rey MD  [address]  [facility] [id number] [phone]    Schedule an appointment as soon as possible for a visit in 1 day(s)        Medications Prescribed:  Current Discharge Medication List                           [1]   Past Medical History:   Cold sore       [2]   Past Surgical History:  Procedure Laterality Date              Tonsillectomy      as a child   [3] (Not in a hospital admission)   [4] No family history on file.  [5]   Social History  Socioeconomic History    Marital status:     Number of children: 1    Years of education: 18    Highest education level: Master's degree (e.g., MA, MS, [place], MEd, MSW, DANGELO)   Tobacco Use    Smoking status: Never     Passive exposure: Never    Smokeless tobacco: Never   Vaping Use    Vaping status: Never Used   Substance and Sexual Activity    Alcohol use: Never    Drug use: Never     Sexual activity: Yes     Partners: Male   Other Topics Concern    Blood Transfusions Yes    Caffeine Concern Yes    Special Diet No    Exercise No    Seat Belt Yes

## 2025-04-22 NOTE — PROGRESS NOTES
Active fetus Reports SOB that is worse with exertion.  Denies any chest pain or palpations.   Denies any vaginal bleeding, leaking of fluid or vaginal discharge. Pedal edema present  No signs signs of PTL.  Reviewed S&S of PTL  Warning signs reviewed  All questions answered.     Counseled on GBS and consents  Declines chaperone    Dyspnea & edema ; pt to ER for evaluation- Dr Daniel notified    Has c/s scheduled for 5/13 7:30 am with Dr Conway

## 2025-04-24 LAB
ATRIAL RATE: 81 BPM
GROUP B STREP BY PCR FOR PCR OVT: NEGATIVE
P AXIS: 31 DEGREES
P-R INTERVAL: 128 MS
Q-T INTERVAL: 376 MS
QRS DURATION: 66 MS
QTC CALCULATION (BEZET): 436 MS
R AXIS: 33 DEGREES
T AXIS: 18 DEGREES
VENTRICULAR RATE: 81 BPM

## 2025-04-29 ENCOUNTER — APPOINTMENT (OUTPATIENT)
Dept: OBGYN CLINIC | Facility: HOSPITAL | Age: 35
End: 2025-04-29
Payer: MEDICAID

## 2025-04-29 ENCOUNTER — HOSPITAL ENCOUNTER (OUTPATIENT)
Facility: HOSPITAL | Age: 35
Discharge: HOME | End: 2025-04-29
Attending: ADVANCED PRACTICE MIDWIFE | Admitting: OBSTETRICS & GYNECOLOGY
Payer: MEDICAID

## 2025-04-29 ENCOUNTER — ROUTINE PRENATAL (OUTPATIENT)
Dept: OBGYN CLINIC | Facility: CLINIC | Age: 35
End: 2025-04-29
Payer: MEDICAID

## 2025-04-29 ENCOUNTER — NST DOCUMENTATION (OUTPATIENT)
Dept: OBGYN CLINIC | Facility: CLINIC | Age: 35
End: 2025-04-29

## 2025-04-29 VITALS
BODY MASS INDEX: 36.88 KG/M2 | DIASTOLIC BLOOD PRESSURE: 74 MMHG | HEIGHT: 67 IN | WEIGHT: 235 LBS | SYSTOLIC BLOOD PRESSURE: 128 MMHG

## 2025-04-29 VITALS — SYSTOLIC BLOOD PRESSURE: 110 MMHG | DIASTOLIC BLOOD PRESSURE: 65 MMHG | HEART RATE: 84 BPM

## 2025-04-29 DIAGNOSIS — O99.210 OBESITY IN PREGNANCY (HCC): Primary | ICD-10-CM

## 2025-04-29 DIAGNOSIS — Z34.90 PREGNANCY (HCC): ICD-10-CM

## 2025-04-29 PROCEDURE — 59025 FETAL NON-STRESS TEST: CPT

## 2025-04-29 PROCEDURE — 59025 FETAL NON-STRESS TEST: CPT | Performed by: ADVANCED PRACTICE MIDWIFE

## 2025-04-29 PROCEDURE — 99213 OFFICE O/P EST LOW 20 MIN: CPT | Performed by: OBSTETRICS & GYNECOLOGY

## 2025-04-29 RX ORDER — CHOLECALCIFEROL (VITAMIN D3) 25 MCG
1 TABLET,CHEWABLE ORAL DAILY
COMMUNITY

## 2025-04-29 NOTE — NST
Nonstress Test   Patient: Melia Mendoza    Gestation: 37w2d    Diagnosis from order: Obesity in pregnancy    Problem List: Problem List[1]    NST:        2025   NST DOCUMENTATION   Variability 6-25 BPM   Accelerations Yes   Decelerations None   Baseline 150 BPM   Uterine Irritability No   Contractions Irregular   Acoustic Stimulator No   Nonstress Test Interpretation Reactive   Nonstress Test Second Interpretation Reactive   FHR Category Category I   Comments +FM   NST Completed by Lang DIGGS   Disposition home    Testing Plan Weekly NST   Provider Notified Brown CNM         I agree with the above evaluation. NST completed.  Maria Fernanda Ryan CNM  2025  11:55 AM             [1]   Patient Active Problem List  Diagnosis    Previous  section    Obesity in pregnancy ([facility])    Preferred language is Wolof    History of macrosomia in infant in prior pregnancy, currently pregnant ([facility])     (vaginal birth after ) ([facility])    Third degree laceration of perineum during delivery, postpartum ([facility])    Child with Down syndrome    Declines vaginal birth after  trial ([facility])    Shortness of breath during pregnancy ([facility])

## 2025-04-29 NOTE — PROGRESS NOTES
RETURN OB VISIT    Melia Mendoza is a 34 year old  at 37w2d presenting for return OB visit. Today she reports no contractions, vaginal bleeding or leaking fluid. Baby is moving well.    NST in triage today was reactive  Routine prenatal care up to date  pCS scheduled     Follow up in 1wk.     Nichole Wetzel DO

## 2025-05-06 ENCOUNTER — HOSPITAL ENCOUNTER (OUTPATIENT)
Facility: HOSPITAL | Age: 35
Discharge: HOME | End: 2025-05-06
Attending: ADVANCED PRACTICE MIDWIFE | Admitting: OBSTETRICS & GYNECOLOGY
Payer: MEDICAID

## 2025-05-06 VITALS — HEART RATE: 86 BPM | DIASTOLIC BLOOD PRESSURE: 77 MMHG | SYSTOLIC BLOOD PRESSURE: 119 MMHG

## 2025-05-06 DIAGNOSIS — Z34.90 PREGNANCY (HCC): ICD-10-CM

## 2025-05-06 LAB
BILIRUB UR QL: NEGATIVE
COLOR UR: YELLOW
GLUCOSE UR-MCNC: NORMAL MG/DL
HGB UR QL STRIP.AUTO: NEGATIVE
KETONES UR-MCNC: NEGATIVE MG/DL
LEUKOCYTE ESTERASE UR QL STRIP.AUTO: 500
NITRITE UR QL STRIP.AUTO: NEGATIVE
PH UR: 6 [PH] (ref 5–8)
RBC #/AREA URNS AUTO: >10 /HPF
SP GR UR STRIP: 1.02 (ref 1–1.03)
UROBILINOGEN UR STRIP-ACNC: NORMAL

## 2025-05-06 PROCEDURE — 59025 FETAL NON-STRESS TEST: CPT | Performed by: ADVANCED PRACTICE MIDWIFE

## 2025-05-06 NOTE — TRIAGE
[facility]  part of [facility]      Triage Note    Melia Mendoza Patient Status:  Outpatient    12/3/1990 MRN [id number]   Location [facility] Attending Maria Fernanda Ryan CNM   Hosp Day # 0 PCP No primary care provider on file.      Para:   Estimated Date of Delivery: 25  Gestation: 38w2d    Chief Complaint    Non-stress Test         Allergies:  Allergies[1]    Orders Placed This Encounter   Procedures    Urinalysis, Routine    Urine Culture, Routine       Lab Results   Component Value Date    WBC 12.8 (H) 2025    HGB 11.9 (L) 2025    HCT 36.3 2025    .0 2025    CREATSERUM 0.54 (L) 2025    BUN <5 (L) 2025     2025    K 3.5 2025     2025    CO2 20.0 (L) 2025    GLU 92 2025    CA 8.4 (L) 2025    ALB 3.5 2025    ALKPHO 135 (H) 2025    BILT 0.3 2025    TP 6.1 2025    AST 15 2025    ALT 13 2025    TSH 0.824 2024       Clinitek UA  Lab Results   Component Value Date    GLUUR Normal 2025    SPECGRAVITY 1.021 2025    URINECUL No Growth at 18-24 hrs. 2024       UA  Lab Results   Component Value Date    COLORUR Yellow 2025    CLARITY Turbid (A) 2025    SPECGRAVITY 1.021 2025    PROUR Trace (A) 2025    GLUUR Normal 2025    KETUR Negative 2025    BILUR Negative 2025    BLOODURINE Negative 2025    NITRITE Negative 2025    UROBILINOGEN Normal 2025    LEUUR 500 (A) 2025       Vitals:    25 1115   BP: 119/77   Pulse: 86       NST  Variability: Moderate           Accelerations: Yes           Decelerations: None            Baseline: 130 BPM           Uterine Irritability: No           Contractions: Irregular                                        Acoustic Stimulator: No           Nonstress Test Interpretation: Reactive           Nonstress Test Second  Interpretation: Reactive          FHR Category: Category I             Additional Comments Comments: Pt came in for NST. Nst reactive. Pt reporting lower abdominal cramping and some occasional sharp pain. JENISE Ryan CNM notivied, order for UA and urine culture and SVE. JENISE Ryan reciewed results and case. Order for discharge recieved. Pt discharged home in stable condition with written and verbal labor precautions. Pt verbalized udnerstadnding.     Chief Complaint   Patient presents with    Non-stress Test     obesity         Jennie BARBER RN  5/6/2025 1:10 PM    CNM addendum  NST reviewed and reactive. Per triage RN patient complaining of cramping that started on arrival to triage. Does not feel it is happening in waves like contractions. UA and culture sent. Cervix closed. Occasional/irregular contraction on NST. Recommend monitoring at home. If feeling waves of contractions or cramping/pain not resolved then patient to call office.   Maria Fernanda Ryan CNM       [1]   Allergies  Allergen Reactions    Ibuprofen NAUSEA AND VOMITING    Pork Derived Products OTHER (SEE COMMENTS)     Yazdanism

## 2025-05-12 ENCOUNTER — TELEPHONE (OUTPATIENT)
Dept: OBGYN UNIT | Facility: HOSPITAL | Age: 35
End: 2025-05-12

## 2025-05-12 ENCOUNTER — TELEPHONE (OUTPATIENT)
Dept: OBGYN CLINIC | Facility: CLINIC | Age: 35
End: 2025-05-12

## 2025-05-12 NOTE — TELEPHONE ENCOUNTER
Incoming fax received from [employer] with breast pump order. Order signed and faxed to number provided.

## 2025-05-13 ENCOUNTER — HOSPITAL ENCOUNTER (INPATIENT)
Facility: HOSPITAL | Age: 35
LOS: 3 days | Discharge: HOME | End: 2025-05-16
Attending: OBSTETRICS & GYNECOLOGY | Admitting: OBSTETRICS & GYNECOLOGY
Payer: MEDICAID

## 2025-05-13 ENCOUNTER — ANESTHESIA (OUTPATIENT)
Dept: OBGYN UNIT | Facility: HOSPITAL | Age: 35
End: 2025-05-13
Payer: MEDICAID

## 2025-05-13 ENCOUNTER — ANESTHESIA EVENT (OUTPATIENT)
Dept: OBGYN UNIT | Facility: HOSPITAL | Age: 35
End: 2025-05-13
Payer: MEDICAID

## 2025-05-13 PROBLEM — Z34.90 PREGNANCY (HCC): Status: ACTIVE | Noted: 2025-05-13

## 2025-05-13 LAB
ANTIBODY SCREEN: NEGATIVE
BASOPHILS # BLD AUTO: 0.03 X10(3) UL (ref 0–0.2)
BASOPHILS NFR BLD AUTO: 0.2 %
DEPRECATED RDW RBC AUTO: 47.3 FL (ref 35.1–46.3)
EOSINOPHIL # BLD AUTO: 0.14 X10(3) UL (ref 0–0.7)
EOSINOPHIL NFR BLD AUTO: 1.1 %
ERYTHROCYTE [DISTWIDTH] IN BLOOD BY AUTOMATED COUNT: 17.3 % (ref 11–15)
HCT VFR BLD AUTO: 38.7 % (ref 35–48)
HGB BLD-MCNC: 12.8 G/DL (ref 12–16)
IMM GRANULOCYTES # BLD AUTO: 0.06 X10(3) UL (ref 0–1)
IMM GRANULOCYTES NFR BLD: 0.5 %
LYMPHOCYTES # BLD AUTO: 2.41 X10(3) UL (ref 1–4)
LYMPHOCYTES NFR BLD AUTO: 19.3 %
MCH RBC QN AUTO: 25.1 PG (ref 26–34)
MCHC RBC AUTO-ENTMCNC: 33.1 G/DL (ref 31–37)
MCV RBC AUTO: 76 FL (ref 80–100)
MONOCYTES # BLD AUTO: 0.84 X10(3) UL (ref 0.1–1)
MONOCYTES NFR BLD AUTO: 6.7 %
NEUTROPHILS # BLD AUTO: 8.99 X10 (3) UL (ref 1.5–7.7)
NEUTROPHILS # BLD AUTO: 8.99 X10(3) UL (ref 1.5–7.7)
NEUTROPHILS NFR BLD AUTO: 72.2 %
PLATELET # BLD AUTO: 277 10(3)UL (ref 150–450)
RBC # BLD AUTO: 5.09 X10(6)UL (ref 3.8–5.3)
RH BLOOD TYPE: POSITIVE
T PALLIDUM AB SER QL IA: NONREACTIVE
WBC # BLD AUTO: 12.5 X10(3) UL (ref 4–11)

## 2025-05-13 PROCEDURE — 59514 CESAREAN DELIVERY ONLY: CPT | Performed by: STUDENT IN AN ORGANIZED HEALTH CARE EDUCATION/TRAINING PROGRAM

## 2025-05-13 PROCEDURE — 59514 CESAREAN DELIVERY ONLY: CPT | Performed by: OBSTETRICS & GYNECOLOGY

## 2025-05-13 RX ORDER — HYDROCODONE BITARTRATE AND ACETAMINOPHEN 7.5; 325 MG/1; MG/1
2 TABLET ORAL EVERY 6 HOURS PRN
Refills: 0 | Status: ACTIVE | OUTPATIENT
Start: 2025-05-13 | End: 2025-05-14

## 2025-05-13 RX ORDER — FAMOTIDINE 10 MG/ML
20 INJECTION, SOLUTION INTRAVENOUS ONCE
Status: COMPLETED | OUTPATIENT
Start: 2025-05-13 | End: 2025-05-13

## 2025-05-13 RX ORDER — AMMONIA 15 % (W/V)
0.3 AMPUL (EA) INHALATION AS NEEDED
Status: DISCONTINUED | OUTPATIENT
Start: 2025-05-13 | End: 2025-05-16

## 2025-05-13 RX ORDER — MORPHINE SULFATE 1 MG/ML
INJECTION, SOLUTION EPIDURAL; INTRATHECAL; INTRAVENOUS
Status: COMPLETED | OUTPATIENT
Start: 2025-05-13 | End: 2025-05-13

## 2025-05-13 RX ORDER — ONDANSETRON 2 MG/ML
4 INJECTION INTRAMUSCULAR; INTRAVENOUS EVERY 6 HOURS PRN
Status: DISCONTINUED | OUTPATIENT
Start: 2025-05-13 | End: 2025-05-13 | Stop reason: HOSPADM

## 2025-05-13 RX ORDER — HYDROMORPHONE HYDROCHLORIDE 1 MG/ML
0.4 INJECTION, SOLUTION INTRAMUSCULAR; INTRAVENOUS; SUBCUTANEOUS
Refills: 0 | Status: DISPENSED | OUTPATIENT
Start: 2025-05-13 | End: 2025-05-14

## 2025-05-13 RX ORDER — ACETAMINOPHEN 500 MG
1000 TABLET ORAL ONCE
Status: COMPLETED | OUTPATIENT
Start: 2025-05-13 | End: 2025-05-13

## 2025-05-13 RX ORDER — PROCHLORPERAZINE EDISYLATE 5 MG/ML
5 INJECTION INTRAMUSCULAR; INTRAVENOUS ONCE AS NEEDED
Status: ACTIVE | OUTPATIENT
Start: 2025-05-13 | End: 2025-05-13

## 2025-05-13 RX ORDER — IBUPROFEN 600 MG/1
600 TABLET, FILM COATED ORAL EVERY 6 HOURS
Status: DISCONTINUED | OUTPATIENT
Start: 2025-05-14 | End: 2025-05-13

## 2025-05-13 RX ORDER — EPHEDRINE SULFATE 50 MG/ML
INJECTION INTRAVENOUS AS NEEDED
Status: DISCONTINUED | OUTPATIENT
Start: 2025-05-13 | End: 2025-05-13 | Stop reason: SURG

## 2025-05-13 RX ORDER — ONDANSETRON 2 MG/ML
4 INJECTION INTRAMUSCULAR; INTRAVENOUS ONCE AS NEEDED
Status: ACTIVE | OUTPATIENT
Start: 2025-05-13 | End: 2025-05-13

## 2025-05-13 RX ORDER — SIMETHICONE 80 MG
80 TABLET,CHEWABLE ORAL 3 TIMES DAILY PRN
Status: DISCONTINUED | OUTPATIENT
Start: 2025-05-13 | End: 2025-05-16

## 2025-05-13 RX ORDER — DOCUSATE SODIUM 100 MG/1
100 CAPSULE, LIQUID FILLED ORAL
Status: DISCONTINUED | OUTPATIENT
Start: 2025-05-13 | End: 2025-05-14

## 2025-05-13 RX ORDER — ACETAMINOPHEN 325 MG/1
650 TABLET ORAL EVERY 6 HOURS PRN
Status: ACTIVE | OUTPATIENT
Start: 2025-05-13 | End: 2025-05-14

## 2025-05-13 RX ORDER — DIPHENHYDRAMINE HCL 25 MG
25 CAPSULE ORAL EVERY 4 HOURS PRN
Status: ACTIVE | OUTPATIENT
Start: 2025-05-13 | End: 2025-05-14

## 2025-05-13 RX ORDER — BUPIVACAINE HYDROCHLORIDE 7.5 MG/ML
INJECTION, SOLUTION INTRASPINAL
Status: COMPLETED | OUTPATIENT
Start: 2025-05-13 | End: 2025-05-13

## 2025-05-13 RX ORDER — ACETAMINOPHEN 500 MG
1000 TABLET ORAL EVERY 6 HOURS
Status: DISCONTINUED | OUTPATIENT
Start: 2025-05-13 | End: 2025-05-16

## 2025-05-13 RX ORDER — HALOPERIDOL 5 MG/ML
0.5 INJECTION INTRAMUSCULAR ONCE AS NEEDED
Status: ACTIVE | OUTPATIENT
Start: 2025-05-13 | End: 2025-05-13

## 2025-05-13 RX ORDER — ONDANSETRON 2 MG/ML
4 INJECTION INTRAMUSCULAR; INTRAVENOUS EVERY 6 HOURS PRN
Status: DISCONTINUED | OUTPATIENT
Start: 2025-05-13 | End: 2025-05-16

## 2025-05-13 RX ORDER — NALOXONE HYDROCHLORIDE 0.4 MG/ML
0.08 INJECTION, SOLUTION INTRAMUSCULAR; INTRAVENOUS; SUBCUTANEOUS
Status: ACTIVE | OUTPATIENT
Start: 2025-05-13 | End: 2025-05-14

## 2025-05-13 RX ORDER — METOCLOPRAMIDE 10 MG/1
10 TABLET ORAL ONCE
Status: COMPLETED | OUTPATIENT
Start: 2025-05-13 | End: 2025-05-13

## 2025-05-13 RX ORDER — GABAPENTIN 300 MG/1
300 CAPSULE ORAL EVERY 8 HOURS PRN
Status: DISCONTINUED | OUTPATIENT
Start: 2025-05-13 | End: 2025-05-14

## 2025-05-13 RX ORDER — BISACODYL 10 MG
10 SUPPOSITORY, RECTAL RECTAL ONCE AS NEEDED
Status: DISCONTINUED | OUTPATIENT
Start: 2025-05-13 | End: 2025-05-16

## 2025-05-13 RX ORDER — SODIUM CHLORIDE, SODIUM LACTATE, POTASSIUM CHLORIDE, CALCIUM CHLORIDE 600; 310; 30; 20 MG/100ML; MG/100ML; MG/100ML; MG/100ML
125 INJECTION, SOLUTION INTRAVENOUS CONTINUOUS
Status: DISCONTINUED | OUTPATIENT
Start: 2025-05-13 | End: 2025-05-13 | Stop reason: HOSPADM

## 2025-05-13 RX ORDER — HYDROCODONE BITARTRATE AND ACETAMINOPHEN 7.5; 325 MG/1; MG/1
1 TABLET ORAL EVERY 6 HOURS PRN
Refills: 0 | Status: DISPENSED | OUTPATIENT
Start: 2025-05-13 | End: 2025-05-14

## 2025-05-13 RX ORDER — HYDROMORPHONE HYDROCHLORIDE 1 MG/ML
0.6 INJECTION, SOLUTION INTRAMUSCULAR; INTRAVENOUS; SUBCUTANEOUS
Refills: 0 | Status: ACTIVE | OUTPATIENT
Start: 2025-05-13 | End: 2025-05-14

## 2025-05-13 RX ORDER — LIDOCAINE HYDROCHLORIDE 10 MG/ML
INJECTION, SOLUTION INFILTRATION; PERINEURAL
Status: COMPLETED | OUTPATIENT
Start: 2025-05-13 | End: 2025-05-13

## 2025-05-13 RX ORDER — NALBUPHINE HYDROCHLORIDE 10 MG/ML
2.5 INJECTION INTRAMUSCULAR; INTRAVENOUS; SUBCUTANEOUS EVERY 4 HOURS PRN
Status: DISPENSED | OUTPATIENT
Start: 2025-05-13 | End: 2025-05-14

## 2025-05-13 RX ORDER — FAMOTIDINE 20 MG/1
20 TABLET, FILM COATED ORAL ONCE
Status: COMPLETED | OUTPATIENT
Start: 2025-05-13 | End: 2025-05-13

## 2025-05-13 RX ORDER — KETOROLAC TROMETHAMINE 30 MG/ML
30 INJECTION, SOLUTION INTRAMUSCULAR; INTRAVENOUS EVERY 6 HOURS
Status: DISCONTINUED | OUTPATIENT
Start: 2025-05-13 | End: 2025-05-13 | Stop reason: SINTOL

## 2025-05-13 RX ORDER — METOCLOPRAMIDE HYDROCHLORIDE 5 MG/ML
10 INJECTION INTRAMUSCULAR; INTRAVENOUS ONCE
Status: COMPLETED | OUTPATIENT
Start: 2025-05-13 | End: 2025-05-13

## 2025-05-13 RX ORDER — CITRIC ACID/SODIUM CITRATE 334-500MG
30 SOLUTION, ORAL ORAL ONCE
Status: COMPLETED | OUTPATIENT
Start: 2025-05-13 | End: 2025-05-13

## 2025-05-13 RX ORDER — DIPHENHYDRAMINE HYDROCHLORIDE 50 MG/ML
12.5 INJECTION, SOLUTION INTRAMUSCULAR; INTRAVENOUS EVERY 4 HOURS PRN
Status: DISPENSED | OUTPATIENT
Start: 2025-05-13 | End: 2025-05-14

## 2025-05-13 RX ADMIN — MORPHINE SULFATE 0.3 MG: 1 INJECTION, SOLUTION EPIDURAL; INTRATHECAL; INTRAVENOUS at 07:52:00

## 2025-05-13 RX ADMIN — SODIUM CHLORIDE, SODIUM LACTATE, POTASSIUM CHLORIDE, CALCIUM CHLORIDE: 600; 310; 30; 20 INJECTION, SOLUTION INTRAVENOUS at 08:47:00

## 2025-05-13 RX ADMIN — BUPIVACAINE HYDROCHLORIDE 1.5 ML: 7.5 INJECTION, SOLUTION INTRASPINAL at 07:52:00

## 2025-05-13 RX ADMIN — EPHEDRINE SULFATE 10 MG: 50 INJECTION INTRAVENOUS at 08:05:00

## 2025-05-13 RX ADMIN — SODIUM CHLORIDE, SODIUM LACTATE, POTASSIUM CHLORIDE, CALCIUM CHLORIDE: 600; 310; 30; 20 INJECTION, SOLUTION INTRAVENOUS at 07:25:00

## 2025-05-13 RX ADMIN — LIDOCAINE HYDROCHLORIDE 5 ML: 10 INJECTION, SOLUTION INFILTRATION; PERINEURAL at 07:52:00

## 2025-05-13 RX ADMIN — EPHEDRINE SULFATE 10 MG: 50 INJECTION INTRAVENOUS at 08:17:00

## 2025-05-13 NOTE — H&P
[facility]  Obstetrics and Gynecology  History & Physical    Melia Mendoza Patient Status:  Inpatient    12/3/1990 MRN [id number]   Location [facility] Attending Lisa Callejas MD   Hospital Day 0 PCP No primary care provider on file.     CC: Patient is here for scheduled repeat  section     SUBJECTIVE:    Melia Mendoza is a 34 year old  female at 39w2d Estimated Date of Delivery: 25 who is being admitted for . Her current obstetrical history is significant for previously  section, obesity, history 3rd laceration. Patient reports no complaints.     {negative UCx.    negative VB.   negative LOF.    positive Fetal movement.   negative Nausea, Vomiting, headache, vision changes and RUQ/Epigastric pain.       VICKEY Confirmation  LMP: Patient's last menstrual period was 2024 (exact date).  VICKEY: 2025, by Last Menstrual Period     OB Ultrasounds:   1) OB US 3/25/25  Single IUP in cephalic presentation.    Placenta is anterior.   A 3 vessel cord is noted.  Cardiac activity is present at 124 bpm  EFW 2507 g ( 5 lb 8 oz); 81%.    NANCY is  24.3 cm.  MVP is 7 cm    Obstetric History:   OB History    Para Term  AB Living   3 2 2 0 0 2   SAB IAB Ectopic Multiple Live Births   0 0 0 0 2      # Outcome Date GA Lbr Terrell/2nd Weight Sex Type Anes PTL Lv   3 Current            2 Term 23 40w0d 09:09 / 00:54 8 lb 3.2 oz (3.72 kg) F VAGINAL SHELLEY EPI N GHISLAINE   1 Term 10/30/20 41w1d  9 lb 8.7 oz (4.33 kg) M CS-LTranv EPI  GHISLAINE      Complications: Failure to Progress in Second Stage     Past Medical History: Past Medical History[1]  Past Social History: Past Surgical History[2]  Family History: Family History[3]  Social History:   Social History     Tobacco Use    Smoking status: Never     Passive exposure: Never    Smokeless tobacco: Never   Substance Use Topics    Alcohol use: Never       Home Meds: Prescriptions Prior to  Admission[4]  Allergies: Allergies[5]    OBJECTIVE:    Temp:  [98.9 °F (37.2 °C)] 98.9 °F (37.2 °C)  There is no height or weight on file to calculate BMI.    General: AAO. NAD.   Lungs: Respirations non labored   CV: peripheral pulses +2 bilaterally   Abdomen: FHT present, gravid   Extremities: negative ed0ema bilaterally, negative calf tenderness bilaterally    FHT: moderate variability/125 BPM / Positive accelerations/Negative decelerations   TOCO: q 0 minutes    SVE: deferred        Prenatal Labs Brief Review   Blood Type:   Lab Results   Component Value Date    ABO B 2025    RH Positive 2025     GBS:  Negative      Inpatient labs:  Lab Results   Component Value Date    WBC 12.5 2025    HGB 12.8 2025    HCT 38.7 2025    .0 2025       ASSESSMENT/ PLAN:    Melia Mendoza is a 34 year old  female at 39w2d Estimated Date of Delivery: 25 who is being admitted for scheduled repeat .    Problem List[6]        Risks, benefits, alternatives and possible complications have been discussed in detail with the patient.  Pre-admission, admission, and post admission procedures and expectations were discussed in detail.  All questions answered, all appropriate consents signed at the Hospital. Admission is planned for today.     MD DOMI Guzmán         [1]   Past Medical History:   Cold sore       [2]   Past Surgical History:  Procedure Laterality Date              Tonsillectomy      as a child   [3] History reviewed. No pertinent family history.  [4]   Medications Prior to Admission   Medication Sig Dispense Refill Last Dose/Taking    prenatal vitamin with DHA 27-0.8-228 MG Oral Cap Take 1 capsule by mouth daily.   2025    Ferrous Sulfate 325 (65 Fe) MG Oral Tab Take 1 tablet (325 mg total) by mouth every other day. 30 tablet 2 2025    folic acid 800 MCG Oral Tab Take 1 tablet (800 mcg total) by mouth in the morning.    2025    [] metroNIDAZOLE 0.75 % Vaginal Gel Place 1 Applicatorful vaginally nightly for 5 days. 70 g 0     docusate sodium 100 MG Oral Tab Take 1 tablet (100 mg total) by mouth 2 (two) times daily. (Patient not taking: Reported on 2025) 60 tablet 3    [5]   Allergies  Allergen Reactions    Ibuprofen NAUSEA AND VOMITING    Pork Derived Products OTHER (SEE COMMENTS)     Advent   [6]   Patient Active Problem List  Diagnosis    Previous  section    Obesity in pregnancy ([facility])    Preferred language is Malay    History of macrosomia in infant in prior pregnancy, currently pregnant ([facility])     (vaginal birth after ) ([facility])    Third degree laceration of perineum during delivery, postpartum ([facility])    Child with Down syndrome    Declines vaginal birth after  trial ([facility])    Shortness of breath during pregnancy ([facility])    Pregnancy ([facility])

## 2025-05-13 NOTE — OPERATIVE REPORT
[facility]  part of [facility]     Section Delivery / Operative Note    Melia Mendoza Patient Status:  Inpatient    12/3/1990 MRN [id number]   Location [facility] Attending Lisa Callejas MD   Hosp Day # 0 PCP No primary care provider on file.     Pre Op Diagnosis:  IUP at 39w2d, repeat hx 3rd degree laceration     Post Op Diagnosis:   Same - Delivered    Procedure:  repeat  LTCS     Indications:  Patient is a 34 year old year old  at 39w2d who presented for scheduled repeat  section. The risks, benefits and alternatives were d/w patient including but not limited to risk of injury, infection, bleeding and subsequent  section deliveries. All questions and concerns were addressed. Patient provided verbal and written consent.     Surgeon:  Lisa Callejas MD    Assistant Surgeon:  Carmita King MD     Anesthesia: spinal     Complications: none     Antibiotics:  Ancef 2 grams preoperatively    Hemorrhage?: No, patient is stable, asymptomatic and blood loss is within expected amount following delivery. Standard treatment provided to prevent PPH. Patient does not meet ACOG criteria for hemorrhage at this time.    QBL: Quantitative Blood Loss (mL) 356        IVF: 1500 mL    UO: 200 mL, clear    Specimens: Placenta, cord gases and cord blood    Findings: normal uterus, normal tubes bilaterally, normal ovaries bilaterally. Live female infant, Nuchal Cord:  single nuchal  clear amniotic fluid noted.    Infant:  Date of Delivery: 2025   Time of Delivery: 8:20 AM  Delivery Type: Caesarean Section    Infant Sex  Information for the patient's :  Reji, Girl [[id number]]   female    Infant Birthweight  Information for the patient's :  Reji, Girl [[id number]]   9 lb 11.2 oz (4.4 kg)     Apgars:  1 minute: 9               5 minutes: 9               Placenta  Delivery: spontaneous  Placenta to Pathology: no    Cord:  Cord Gases  Submitted: no  Cord Blood/Tissue Collection: yes  Cord Complications: single nuchal    Procedure:   After informed consent was obtained, the patient was taken to the operating room, prepped and draped in the usual sterile fashion. SCDs and a fragoso catheter were placed and anesthesia was found to be adequate. Two grams of ancef were given for infection prophylaxis. She was prepared and draped in the dorsal supine position with a leftward tilt. A time out was performed. A pfannenstiel skin incision was made and carried down to the fascia. The fascia was incised and extended laterally with Rachel scissors. The superior aspect of the fascia was grasped with kocher clamps, and the rectus muscle was dissected off bluntly then sharply with rachel scissors. In a similar fashion, the inferior aspect of the fascia was grasped with kocher clamps and the underlying rectus muscle was dissected off bluntly and then sharply with Rachel scissors. Hemostasis was achieved with the bovie. The rectus muscle was  in the midline down to the level of the pubic symphysis. The peritoneum was entered bluntly and extended laterally. There was good visualization of the bladder.     The bladder blade was inserted and the vesicouterine peritoneum identified. The lower uterine segment was identified. The lower uterine segment was incised with a scalpel. The amniotic sac was ruptured and clear fluid noted. The incision was extended bluntly with superior and inferior traction.     The fetus was in cephalic presentation. The head was elevated out with the assistance of the pelvis to the the hysterotomy site. Gentle fundal pressure was applied and the infant was delivered without difficulty. Delayed cord clamping for 30 seconds. The cord was clamped and cut. The infant was handed off to the pediatrician. IV oxytocin was initiated to facilitate uterine contractions. The placenta was delivered intact with manual massage of the uterine fundus. The  inside of the uterus was wiped with a lap sponge to assure complete removal of placenta membranes. The uterine incision was closed with a 0-vicryl suture in a continuous running fashion. There was a second figure-of-eight layer placed. The uterus, tubes, and ovaries were normal appearing. The blood clots and fluid were wiped out of the abdomen and pelvis with moist laparotomy sponges. Re-inspection of the hysterotomy, peritomeum and rectus muscles was noted to be entirely hemostatic.    The fascia was closed with 0-vicryl suture in a running fashion. The subcutaneous tissue was copiously irrigated and any bleeding cauterized. The skin was re-approximated with 4-0 Vicryl on Pal NeedleThe patient tolerated the procedure well. All sponge, instrument and needle counts were correct x3. The patient toelrated the procedure well and was taken to the recovery room in a stable and satisfactory condition. The baby was in stable condition to the recovery room.     Specimen:  none    Drains: fragoso to dependant drainage    Condition:   stable    Complications: None; patient tolerated the procedure well.      MD DOMI Guzmán

## 2025-05-13 NOTE — ANESTHESIA PREPROCEDURE EVALUATION
Anesthesia PreOp Note    HPI:     Melia Mendoza is a 34 year old female who presents for preoperative consultation requested by: Lisa Callejas MD    Date of Surgery: 2025    Procedure(s):   SECTION  Indication: repeat hx 3rd degree laceration     Relevant Problems   No relevant active problems       NPO:  Last Liquid Consumption Date: 25  Last Liquid Consumption Time: 2300  Last Solid Consumption Date: 25  Last Solid Consumption Time: 2300  Last Liquid Consumption Date: 25          History Review:  Patient Active Problem List    Diagnosis Date Noted    Pregnancy ([facility]) 2025    Shortness of breath during pregnancy ([facility]) 2025    Declines vaginal birth after  trial ([facility]) 2025    Child with Down syndrome 2023    Third degree laceration of perineum during delivery, postpartum ([facility]) 2023     (vaginal birth after ) ([facility])     History of macrosomia in infant in prior pregnancy, currently pregnant ([facility]) 2023    Previous  section 2022    Obesity in pregnancy ([facility]) 2022    Preferred language is Japanese 2022       Past Medical History[1]    Past Surgical History[2]    Prescriptions Prior to Admission[3]  Current Medications and Prescriptions Ordered in Epic[4]    Allergies[5]    Family History[6]  Social Hx on file[7]    Available pre-op labs reviewed.  Lab Results   Component Value Date    WBC 12.5 (H) 2025    RBC 5.09 2025    HGB 12.8 2025    HCT 38.7 2025    MCV 76.0 (L) 2025    MCH 25.1 (L) 2025    MCHC 33.1 2025    RDW 17.3 (H) 2025    .0 2025     Lab Results   Component Value Date     2025    K 3.5 2025     2025    CO2 20.0 (L) 2025    BUN <5 (L) 2025    CREATSERUM 0.54 (L) 2025    GLU 92 2025    CA 8.4 (L) 2025          Vital Signs:  There is no height or weight on file to calculate BMI.    oral temperature is 98.9 °F (37.2 °C).   Vitals:    25 0715   Temp: 98.9 °F (37.2 °C)   TempSrc: Oral        Anesthesia Evaluation     Patient summary reviewed and Nursing notes reviewed    Airway   Mallampati: II  TM distance: >3 FB  Neck ROM: full  Dental      Pulmonary - normal exam   (+) shortness of breath  Cardiovascular - normal exam  Exercise tolerance: good  (-) hypertension, past MI, CAD    NYHA Classification: I    Neuro/Psych    (+)  neuromuscular disease,      (-) TIA, CVA    GI/Hepatic/Renal    (-) liver disease, renal disease    Endo/Other    (-) diabetes mellitus, hypothyroidism  Abdominal  - normal exam                 Anesthesia Plan:   ASA:  2  Plan:   Spinal  Post-op Pain Management: Intrathecal narcotics  Informed Consent Plan and Risks Discussed With:  Patient and spouse  Discussed plan with:  Attending and surgeon  Provider Attestation (if preop done by other):  SA      I have informed Melia Mendoza and/or legal guardian or family member of the nature of the anesthetic plan, benefits, risks including possible dental damage if relevant, major complications, and any alternative forms of anesthetic management.   All of the patient's questions were answered to the best of my ability. The patient desires the anesthetic management as planned.  I have informed this Melia Mendoza  of the risks of neuraxial anesthesia including, but not limited to: failure,headache, backache, spinal, unilateral/patchy block, difficulty breathing, infection, bleeding,itching, nerve damage, paralysis, death. The patient desires the proposed neuraxial anesthetic as planned.    YANELY LANDAVERDE MD  2025 7:38 AM  Present on Admission:  **None**           [1]   Past Medical History:   Cold sore       [2]   Past Surgical History:  Procedure Laterality Date              Tonsillectomy      as a child   [3]   Medications Prior to Admission   Medication Sig Dispense Refill Last Dose/Taking    prenatal vitamin with  DHA 27-0.8-228 MG Oral Cap Take 1 capsule by mouth daily.   2025    Ferrous Sulfate 325 (65 Fe) MG Oral Tab Take 1 tablet (325 mg total) by mouth every other day. 30 tablet 2 2025    folic acid 800 MCG Oral Tab Take 1 tablet (800 mcg total) by mouth in the morning.   2025    [] metroNIDAZOLE 0.75 % Vaginal Gel Place 1 Applicatorful vaginally nightly for 5 days. 70 g 0     docusate sodium 100 MG Oral Tab Take 1 tablet (100 mg total) by mouth 2 (two) times daily. (Patient not taking: Reported on 2025) 60 tablet 3    [4]   Current Facility-Administered Medications Ordered in Epic   Medication Dose Route Frequency Provider Last Rate Last Admin    lactated ringers infusion  125 mL/hr Intravenous Continuous Lisa Callejas  mL/hr at 25 0724 125 mL/hr at 25 0724    ondansetron (Zofran) 4 MG/2ML injection 4 mg  4 mg Intravenous Q6H PRN Lisa Callejas MD        oxyTOCIN in sodium chloride 0.9% (Pitocin) 30 Units/500mL infusion premix  62.5-900 daisy-units/min Intravenous Continuous Lisa Callejas MD        ceFAZolin (Ancef) 2g in 10mL IV syringe premix  2 g Intravenous Once Lisa Callejas MD         No current [facility]-ordered outpatient medications on file.   [5]   Allergies  Allergen Reactions    Ibuprofen NAUSEA AND VOMITING    Pork Derived Products OTHER (SEE COMMENTS)     Latter day   [6] History reviewed. No pertinent family history.  [7]   Social History  Socioeconomic History    Marital status:     Number of children: 1    Years of education: 18    Highest education level: Master's degree (e.g., MA, MS, [place], MEd, MSW, DANGELO)   Tobacco Use    Smoking status: Never     Passive exposure: Never    Smokeless tobacco: Never   Vaping Use    Vaping status: Never Used   Substance and Sexual Activity    Alcohol use: Never    Drug use: Never    Sexual activity: Yes     Partners: Male   Other Topics Concern    Blood Transfusions Yes    Caffeine Concern Yes     Special Diet No    Exercise No    Seat Belt Yes

## 2025-05-13 NOTE — DISCHARGE INSTRUCTIONS
Post  Section Home Care Instructions     We hope you were pleased with your care at [facility].  We wish you the best outcome and overall experience with the delivery of your baby.  These instructions will help to minimize pain, limit the risk for an infection, and improve the likelihood of a successful recovery.    What to Expect:  Abdominal cramping after delivery especially if you are breastfeeding.   Vaginal bleeding for about 4-6 weeks that may be followed by a yellow or white discharge for a few more weeks.  Your period will resume in approximately 6-8 weeks, unless you are breastfeeding.    If you are bottle feeding, you may notice breast engorgement in about 3 days.  Your breast may be sore and hard. Please wear a tight fitted bra or sports bra for 24-36 hours to help prevent your breast from producing milk, and use ice packs to relive any discomfort.  If you are breastfeeding, nipple dryness is very common the first few days.    Constipation is common after having a baby.  Please increase fluid and fiber in your diet.      Over-The-Counter Medication  Non-prescription anti-inflammatory medications can also help to ease the pain.  You may take Aleve, Tylenol or Ibuprofen   Colace or Metamucil for Constipation  Lanolin for dry nipples  Tucks, Witch Hazel and Epifoam for vaginal/perineum discomfort.   Drink a full glass of water with oral medication and take as directed.    Wound Care  The following instructions will promote proper healing and help to prevent infection  Please use soap and water over incision   Pat your incision dry and leave open to air if possible   If you have steri - strips, then please remove after 4-5 days from your surgery. You may remove after a shower to decrease discomfort.   Do not replace the Steri-Strips, if they come off.  If the tapes come off, leave them off and keep the incision clean.  You do not need to cover the incision or put any medications on the  incision.    Bathing/Showers  You may resume showers  No baths, swimming, hot tubs until your post-partum visit    Home Medication  Resume your home medications as instructed    Diet  Resume your normal diet    Activity  Refrain from vaginal intercourse, vaginal suppositories, tampon use or douches until after your post-partum visit  No exercising for 4 weeks  You may climb stairs minimally for the 1st week.    Do not do heavy housework for at least 2-3 weeks    Return to Work or School  You may return to work in 6-8 weeks  Contact your obstetrician’s office, if you need a medical release. ([phone])    Driving  Avoid driving for 1-2 weeks or sooner if not taking narcotics.    Follow-up Appointment with Your Obstetrician  Call your obstetrician’s office today for an appointment in four weeks.    The number is [phone].  Verify your appointment date, day, time, and location.  At your 1st post-partum office visit:  Your progress will be evaluated, findings reviewed, and any additional concerns and instructions will be discussed.    Questions or Concerns  Call your obstetrician’s office if you experience the following:  Severe pain not controlled by pain medication  Foul smelling vaginal discharge  Heavy bleeding  Shortness of breath  Fever  Redness, increased swelling or drainage from your incision  Crying and periods of sadness that prevents you from caring for yourself and your baby  Burning sensation during urination or inability to urinate  Swelling, redness or abnormal warmth to your leg/calf  Please call [phone]. If your call is made after office hours, a physician will be available to help you.  There is always a provider covering our patients.    Thank you for coming to [facility] to start your new family.  The nurses, obstetricians, and the anesthesiologists try very hard to make sure you receive the best care possible.  Your trust in them as well as us is greatly appreciated.     Thanks so much,   The Providers of [facility]    [facility] has great support for our families even after discharge.  We have virtual or in-person support groups.  Visit our website for the most up-to-date info for our many different support groups. [email or web address]       Outpatient Lactation appointments:  Call [phone]- to schedule an appt.  Our office is located in the Maternal Fetal Medicine office next to [facility] on the first floor.        Support Groups: Moms-to-be are also welcome! All mom's welcome even if its not your first.     MOM & BABY HOUR- Every new mom can use some support in the exciting but challenging adjustment to motherhood. Join us for Mom and Baby Hour where an experienced nurse and lactation consultant will guide conversations and answer questions and provide breastfeeding support.  Most weeks we will also have a guest speaker to present information on many different parenting topics. We welcome mothers and their infants (up to crawling), dad, grandmas, and others to join our group.    Meets most  10:00 - 11:30 a.m.  Masks are not required, but be considerate of others and do not attend if mom or baby have had any symptoms of illness within the previous 24 hours. Location [facility] [place] [address] [place] Go inside the front door and to the right to the “Community Education Room”.      Nurturing Mom- A support group for new and expectant moms looking for support with the transition to parenthood as well as those experiencing symptoms of  anxiety and/or depression.  Please contact [email or web address] if you need directions or the link for the virtual meetings. Please contact [email or web address] if you plan to attend, but please be considerate of others and do not attend if mom or baby have had any symptoms of illness within the previous 24 hours.       Mom's  Line: [phone]  A phone line dedicated for women (or anyone worried about a women) who may be experiencing signs or symptoms of postpartum depression, anxiety, or overwhelmed with new baby. Call 24/7- answered by live trained mental health professionals, free and confidential, emotional support, referrals, in any language.    La Leche League for breast feeding and parent support, Website: [employer]  and for the [place] group and other groups visit [email or web address]/    Facebook groups-  for more support when home- Babies & Mommies [facility] --- you can find mom-to-mom advice and the list of speaker topics for cradle talk program.  Telephone Support  Postpartum depression [place] [employer] [place] (www.ppdil.org)  -Free information and support for pregnant and postpartum women with symptoms of depression, anxiety  -Mom-to-mom support from volunteers who have been through depression    Telephone support: ([phone]) [phone]  Urgent support:(962)-432-4261 (answered 24/7)  Email support: support@ppdil.org    Postpartum Support International (www.postpartum.net)  -Free phone conversation with trained volunteers   (959) 589-2649; after the prompt enter 39260#    National Postpartum Depression Hotline  (676)-PPD-MOMS    Helpful websites:    www.llli.org  www.eOriginal  www.Breastfeed[place].org    Initiation of breast pumping after discharge:     - Add 1 pumping session a day, additional to the infant's feedings, 2-3 weeks after delivery.     - Pump both breasts for 15 mins, immediately after a breast feeding session.    - Pumping first thing in the morning will provide greater output.    - If you chose to pump more than once a day, you should be consistent every day to prevent a breast infection.      - Pump using an electric pump over a hands free pump (electric pumps provided stronger stimulation to the nipples).    - Store the breast milk in 2-3 oz containers.     - Label containers  with date and time.    - Always feed oldest milk first.                 -Pelvic rest for 6 weeks; no sex, tampons, douching, baths, or pools until after 6 weeks check-up.  -No heavy lifting; increase activity gradually.  -No driving if taking narcotics.    CALL YOUR PROVIDER IF:  Increased/heavy bleeding (I.e. Changing a saturated pad every hour).  New onset chills/fever greater than 100.4.  Pain in your vagina or abdomen that gets worse and isn't relieved with medicine.  Swelling or discharge with a bad odor from vagina.  Burning, pain, red streaks, or lumpy areas in your breasts that may be accompanied by flu-like symptoms.  Painful urination, or inability to control urination.  Nausea, vomiting, dizziness, or fainting.  Feelings of extreme sadness or anxiety, or a feeling that you don’t want to be with your baby.  Redness, warmth, or pain in the lower leg.  Chest pain or shortness of breath.  If : Check incision site AT LEAST 2x daily for signs and symptoms of infection (increased redness, warmth, tenderness, or drainage)    Mom & Baby Hour: Meets in person every WEDNESDAY at 10am at the [facility] in [place] ([address]) in the community education room. The group is for new moms and their babies up to 6 months of age. It includes breastfeeding supports with a certified lactation educator to be available to answer your breastfeeding questions.

## 2025-05-13 NOTE — PROGRESS NOTES
Received patient into room 365 via cart . Bedside shift report received from DONTAE Clark. Pt transferred to bed. Bed in lowest and locked position. Side rails up x2. VSS. IV site unremarkable. Baby present in open crib.  ID bands matched with L&D RN.  Patient and family oriented to unit, room and call light within reach.  Safety measures in place, POC followed. Plan of care ongoing.    .

## 2025-05-13 NOTE — PROGRESS NOTES
Patient transferred to mother/baby room 365 per cart in stable condition with baby and personal belongings.  Accompanied by  and staff.  Report given to Karen mother/baby RN.

## 2025-05-13 NOTE — ANESTHESIA PROCEDURE NOTES
Spinal Block    Date/Time: 5/13/2025 7:52 AM    Performed by: Karyn Camacho MD  Authorized by: Karyn Camacho MD      General Information and Staff    Start Time:  5/13/2025 7:52 AM  End Time:  5/13/2025 8:01 AM  Anesthesiologist:  Karyn Camacho MD  Performed by:  Anesthesiologist  Site identification: surface landmarks    Reason for Block: at surgeon's request and post-op pain management    Preanesthetic Checklist: patient identified, IV checked, risks and benefits discussed, monitors and equipment checked, pre-op evaluation, timeout performed, anesthesia consent and sterile technique used      Procedure Details    Patient Position:  Sitting  Prep: ChloraPrep and patient draped    Monitoring:  Cardiac monitor, continuous pulse ox and heart rate  Approach:  Midline  Location:  L3-4  Injection Technique:  Single-shot    Needle    Needle Type:  Pencil-tip  Needle Gauge:  24 G  Needle Length:  3.5 in    Assessment    Sensory Level:  T6  Events: clear CSF, CSF aspirated, well tolerated and blood negative      Additional Comments

## 2025-05-13 NOTE — ANESTHESIA POSTPROCEDURE EVALUATION
Patient: Melia Mendoza    Procedure Summary       Date: 25 Room / Location: [facility]  [facility]    Anesthesia Start: 747 Anesthesia Stop:     Procedure:  SECTION (Abdomen) Diagnosis: (repeat hx 3rd degree laceration )    Surgeons: Lisa Callejas MD Anesthesiologist: Yue Gordon MD    Anesthesia Type: spinal ASA Status: 2            Anesthesia Type: spinal    Vitals Value Taken Time   /67 25 08:47   Temp 97.6 °F (36.4 °C) 25 08:47   Pulse 74 25 08:47   Resp 19 25 08:47   SpO2 100 % 25 08:47   Vitals shown include unfiled device data.    [facility] AN Post Evaluation:   Patient Evaluated in PACU  Patient Participation: complete - patient participated  Level of Consciousness: awake  Pain Management: adequate  Airway Patency:patent  Dental exam unchanged from preop  Yes    Cardiovascular Status: acceptable  Respiratory Status: acceptable  Postoperative Hydration acceptable      YUE GORDON MD  2025 8:48 AM

## 2025-05-14 LAB
BASOPHILS # BLD AUTO: 0.04 X10(3) UL (ref 0–0.2)
BASOPHILS NFR BLD AUTO: 0.3 %
DEPRECATED RDW RBC AUTO: 49.3 FL (ref 35.1–46.3)
EOSINOPHIL # BLD AUTO: 0.19 X10(3) UL (ref 0–0.7)
EOSINOPHIL NFR BLD AUTO: 1.4 %
ERYTHROCYTE [DISTWIDTH] IN BLOOD BY AUTOMATED COUNT: 17.5 % (ref 11–15)
HCT VFR BLD AUTO: 35.5 % (ref 35–48)
HGB BLD-MCNC: 11.4 G/DL (ref 12–16)
IMM GRANULOCYTES # BLD AUTO: 0.08 X10(3) UL (ref 0–1)
IMM GRANULOCYTES NFR BLD: 0.6 %
LYMPHOCYTES # BLD AUTO: 1.46 X10(3) UL (ref 1–4)
LYMPHOCYTES NFR BLD AUTO: 11 %
MCH RBC QN AUTO: 25.2 PG (ref 26–34)
MCHC RBC AUTO-ENTMCNC: 32.1 G/DL (ref 31–37)
MCV RBC AUTO: 78.4 FL (ref 80–100)
MONOCYTES # BLD AUTO: 0.73 X10(3) UL (ref 0.1–1)
MONOCYTES NFR BLD AUTO: 5.5 %
NEUTROPHILS # BLD AUTO: 10.77 X10 (3) UL (ref 1.5–7.7)
NEUTROPHILS # BLD AUTO: 10.77 X10(3) UL (ref 1.5–7.7)
NEUTROPHILS NFR BLD AUTO: 81.2 %
PLATELET # BLD AUTO: 234 10(3)UL (ref 150–450)
RBC # BLD AUTO: 4.53 X10(6)UL (ref 3.8–5.3)
WBC # BLD AUTO: 13.3 X10(3) UL (ref 4–11)

## 2025-05-14 RX ORDER — HYDROCODONE BITARTRATE AND ACETAMINOPHEN 5; 325 MG/1; MG/1
1 TABLET ORAL EVERY 6 HOURS PRN
Refills: 0 | Status: DISCONTINUED | OUTPATIENT
Start: 2025-05-14 | End: 2025-05-15

## 2025-05-14 RX ORDER — GABAPENTIN 300 MG/1
300 CAPSULE ORAL EVERY 8 HOURS
Status: DISCONTINUED | OUTPATIENT
Start: 2025-05-14 | End: 2025-05-16

## 2025-05-14 RX ORDER — IBUPROFEN 600 MG/1
600 TABLET, FILM COATED ORAL EVERY 6 HOURS
Status: DISCONTINUED | OUTPATIENT
Start: 2025-05-14 | End: 2025-05-16

## 2025-05-14 RX ORDER — SENNOSIDES 8.6 MG
8.6 TABLET ORAL 2 TIMES DAILY
Status: DISCONTINUED | OUTPATIENT
Start: 2025-05-14 | End: 2025-05-16

## 2025-05-14 RX ORDER — POLYETHYLENE GLYCOL 3350 17 G/17G
17 POWDER, FOR SOLUTION ORAL DAILY
Status: DISCONTINUED | OUTPATIENT
Start: 2025-05-14 | End: 2025-05-16

## 2025-05-14 NOTE — PROGRESS NOTES
POSTPARTUM PROGRESS NOTE  Date: 2025    Subjective: Patient reports poor pain control at her incision. She struggles to stand up straight and ambulate because of this. She is able to void. Tolerating regular diet. No excessive vaginal bleeding.     Vitals  Vitals:    25 1700 25 2115 25 0109 25 0450   BP: (!) 107/95 123/80 136/88 125/67   BP Location: Right arm Right arm Right arm Right arm   Pulse: 67 75 76 77   Resp: 16 17 16 18   Temp:  97.8 °F (36.6 °C) 98.4 °F (36.9 °C) 98.4 °F (36.9 °C)   TempSrc:  Oral Oral Oral   SpO2: 100% 100% 95% 96%   Weight:             PHYSICAL EXAM  General: resting in bed, no acute distress  Chest: no increasing work of breathing  Abdomen: fundus firm, incision c/d/I with dermabond and Tegaderm  Genitourinary: moderate lochia  Extremities: lower extremities symmetric bilaterally     A/P: Melia Mendoza is a 34 year old  POD#1 s/p rLTCS at 39w2d.     #Routine postpartum care  -Pain: Tylenol, gabapentin and Motrin scheduled, Norco PRN for breakthrough pain  -Hemodynamics:   ---acute blood loss with anemia  ---12.8 > 356mL > 11.4  ---iron supplementation not indicated  -Infant: baby girl doing well  -Infant feeding: breast milk    #Prenatal issues to follow - none    Disposition: anticipate discharge home POD#2-3    Nichole Wetzel DO

## 2025-05-14 NOTE — CM/SW NOTE
Sw received MDO for SDOH.   SW met with patient and baby bedside.  SW confirmed face sheet contact as correct.    Baby girl name: Ailyn  Date & time of delivery: 25 8:13 AM  Delivery method:  Section  Siblings age: 4.5 and 2 y.o.    Patient employed: SAHM  Length of maternity leave: NA    Father of baby employed: Yes  Length of paternity leave: Unknown     Breast or formula feed: Both    Pediatrician: Not chosen yet    Infant Insurance: [place] Medicaid  Change HC contacted: No    Mental Health History: Denies    Medications: Denies    Therapist: Mallory    Psychiatrist: Mallory BLANDON discussed signs, symptoms and risks associated with post partum depression & anxiety.  SW provided pt with PMAD resources.  Other resources provided: Mom line, Mood boost, SDOH [place], WIC [employer] Medicaid flyer     Patient support system: FOB and Family    Patient denied current questions/needs from [facility].    SW to remain available for support and/or discharge planning.    Sarah Garcia MSW, LSW   [id number]

## 2025-05-14 NOTE — LACTATION NOTE
This note was copied from a baby's chart.  LACTATION NOTE - INFANT    Evaluation Type  Evaluation Type: Inpatient    Problems & Assessment  Problems: comment/detail: LGA  Infant Assessment: Anterior fontanel soft and flat, Oral mucous membranes moist, Hunger cues present  Muscle tone: Appropriate for GA    Feeding Assessment  Summary Current Feeding: Breastfeeding with breast milk supplement, Breastfeeding with formula supplement  Breastfeeding Assessment: Assisted with breastfeeding w/mother's permission, Pulling on nipple, Calm and ready to breastfeed, Needs pacing  Breastfeeding Positions: football, left breast  Latch: Repeated attempts, hold nipple in mouth, stimulate to suck  Audible Sucks/Swallows: A few with stimulation  Type of Nipple: Everted (after stimulation)  Comfort (Breast/Nipple): Soft/non-tender  Hold (Positioning): Full assist, teach one side, mother does other, staff holds  LATCH Score: 7

## 2025-05-14 NOTE — PLAN OF CARE
Problem: PAIN - ADULT  Goal: Verbalizes/displays adequate comfort level or patient's stated pain goal  Description: INTERVENTIONS:- Encourage pt to monitor pain and request assistance- Assess pain using appropriate pain scale- Administer analgesics based on type and severity of pain and evaluate response- Implement non-pharmacological measures as appropriate and evaluate response- Consider cultural and social influences on pain and pain management- Manage/alleviate anxiety- Utilize distraction and/or relaxation techniques- Monitor for opioid side effects- Notify MD/LIP if interventions unsuccessful or patient reports new pain- Anticipate increased pain with activity and pre-medicate as appropriate  INTERVENTIONS:- Encourage pt to monitor pain and request assistance- Assess pain using appropriate pain scale- Administer analgesics based on type and severity of pain and evaluate response- Implement non-pharmacological measures as appropriate and evaluate response- Consider cultural and social influences on pain and pain management- Manage/alleviate anxiety- Utilize distraction and/or relaxation techniques- Monitor for opioid side effects- Notify MD/LIP if interventions unsuccessful or patient reports new pain- Anticipate increased pain with activity and pre-medicate as appropriate  Outcome: Progressing     Problem: Patient Centered Care  Goal: Patient preferences are identified and integrated in the patient's plan of care  Description: Interventions:- What would you like us to know as we care for you? Third baby, breastfeeding. Pain fairly severe, plan updated with Dr. Wetzel this morning during rounds (see orders)- Provide timely, complete, and accurate information to patient/family- Incorporate patient and family knowledge, values, beliefs, and cultural backgrounds into the planning and delivery of care- Encourage patient/family to participate in care and decision-making at the level they choose- Honor patient and  family perspectives and choices  Outcome: Progressing     Problem: POSTPARTUM  Goal: Long Term Goal:Experiences normal postpartum course  Description: INTERVENTIONS:- Assess and monitor vital signs and lab values.- Assess fundus and lochia.- Provide ice/sitz baths for perineum discomfort.- Monitor healing of incision/episiotomy/laceration, and assess for signs and symptoms of infection and hematoma.- Assess bladder function and monitor for bladder distention.- Provide/instruct/assist with pericare as needed.- Provide VTE prophylaxis as needed.- Monitor bowel function.- Encourage ambulation and provide assistance as needed.- Assess and monitor emotional status and provide social service/psych resources as needed.- Utilize standard precautions and use personal protective equipment as indicated. Ensure aseptic care of all intravenous lines and invasive tubes/drains.- Obtain immunization and exposure to communicable diseases history.  Outcome: Progressing  Goal: Optimize infant feeding at the breast  Description: INTERVENTIONS:- Initiate breast feeding within first hour after birth. - Monitor effectiveness of current breast feeding efforts.- Assess support systems available to mother/family.- Identify cultural beliefs/practices regarding lactation, letdown techniques, maternal food preferences.- Assess mother's knowledge and previous experience with breast feeding.- Provide information as needed about early infant feeding cues (e.g., rooting, lip smacking, sucking fingers/hand) versus late cue of crying.- Discuss/demonstrate breast feeding aids (e.g., infant sling, nursing footstool/pillows, and breast pumps).- Encourage mother/other family members to express feelings/concerns, and actively listen.- Educate father/SO about benefits of breast feeding and how to manage common lactation challenges.- Recommend avoidance of specific medications or substances incompatible with breast feeding.- Assess and monitor for signs of  nipple pain/trauma.- Instruct and provide assistance with proper latch.- Review techniques for milk expression (breast pumping) and storage of breast milk. Provide pumping equipment/supplies, instructions and assistance, as needed.- Encourage rooming-in and breast feeding on demand.- Encourage skin-to-skin contact.- Provide LC support as needed.- Assess for and manage engorgement.- Provide breast feeding education handouts and information on community breast feeding support.   Outcome: Progressing  Goal: Establishment of adequate milk supply with medication/procedure interruptions  Description: INTERVENTIONS:- Review techniques for milk expression (breast pumping). - Provide pumping equipment/supplies, instructions, and assistance until it is safe to breastfeed infant.  Outcome: Progressing  Goal: Appropriate maternal -  bonding  Description: INTERVENTIONS:- Assess caregiver- interactions.- Assess caregiver's emotional status and coping mechanisms.- Encourage caregiver to participate in  daily care.- Assess support systems available to mother/family.- Provide /case management support as needed.  Outcome: Progressing

## 2025-05-14 NOTE — ANESTHESIA POST-OP FOLLOW-UP NOTE
Post Duramorph and Block Rounds    Melia Mendoza is a postoperative day [unfilled] for  SECTION on @[facility]@.    Time of block placement: 0752    Melia Mendoza no complaints.  She rates her pain at 0 out of 10.  There are no signs or symptoms of systemic local anesthetic toxicity.      Vitals: [unfilled]  Temp (24hrs), Av.2 °F (36.8 °C), Min:97.8 °F (36.6 °C), Max:98.4 °F (36.9 °C)      Labs: @[facility](WBC:3,PLT:3,INR:3,aPTT:3)@       Assessment and Plan: Melia Mendoza's block has resolved.. Plan transition to oral pain medications ordered by surgeon. No further anesthesia follow up is required.

## 2025-05-14 NOTE — LACTATION NOTE
LACTATION NOTE - MOTHER      Evaluation Type: Inpatient    Problems identified  Problems identified: Knowledge deficit, Milk supply not WNL  Milk supply not WNL: Reduced (potential)    Maternal history  Maternal history: Obesity, Caesarean section    Breastfeeding goal  Breastfeeding goal: To maintain breast milk feeding per patient goal    Maternal Assessment  Bilateral Breasts: Soft, Symmetrical  Bilateral Nipples: Sore, WNL  Prior breastfeeding experience (comment below): Multip, Successful  Breastfeeding Assistance: Breastfeeding assistance provided with permission, Hand expression provided with permission    Pain assessment  Location/Comment: lots of pain from C/S & cramps with breast feeding, too much pain for now to be set up with breast pump  Treatment of Sore Nipples: Deeper latch techniques, Expressed breast milk, Lanolin    Guidelines for use of:  Equipment: Lanolin                Assisted with a feeding session. Baby is LGA, all blood sugars are done and were WNL. Patient breast fed well with her other children, she is having lots of pain, and baby has been very active with feeding. For now mom wants to have times that she supplements with formula, and plans to mostly exclusively breast fed at home, with times where she pumps and gives a bottle. She is comfortable with her feeding plan. For this visit she was having too much abdominal pain to handle pumping, and discussed rational and needs for pumping, and plan to come back later today when feeling better and can review pump, and set her up with one. All questions answered from patient, and able to assist with a latch in football hold and baby did well

## 2025-05-14 NOTE — PLAN OF CARE
Problem: PAIN - ADULT  Goal: Verbalizes/displays adequate comfort level or patient's stated pain goal  Description: INTERVENTIONS:- Encourage pt to monitor pain and request assistance- Assess pain using appropriate pain scale- Administer analgesics based on type and severity of pain and evaluate response- Implement non-pharmacological measures as appropriate and evaluate response- Consider cultural and social influences on pain and pain management- Manage/alleviate anxiety- Utilize distraction and/or relaxation techniques- Monitor for opioid side effects- Notify MD/LIP if interventions unsuccessful or patient reports new pain- Anticipate increased pain with activity and pre-medicate as appropriate  INTERVENTIONS:- Encourage pt to monitor pain and request assistance- Assess pain using appropriate pain scale- Administer analgesics based on type and severity of pain and evaluate response- Implement non-pharmacological measures as appropriate and evaluate response- Consider cultural and social influences on pain and pain management- Manage/alleviate anxiety- Utilize distraction and/or relaxation techniques- Monitor for opioid side effects- Notify MD/LIP if interventions unsuccessful or patient reports new pain- Anticipate increased pain with activity and pre-medicate as appropriate  Outcome: Progressing     Problem: GASTROINTESTINAL - ADULT  Goal: Minimal or absence of nausea and vomiting  Description: INTERVENTIONS:- Maintain adequate hydration with IV or PO as ordered and tolerated- Nasogastric tube to low intermittent suction as ordered- Evaluate effectiveness of ordered antiemetic medications- Provide nonpharmacologic comfort measures as appropriate- Advance diet as tolerated, if ordered- Obtain nutritional consult as needed- Evaluate fluid balance  Outcome: Progressing  Goal: Maintains or returns to baseline bowel function  Description: INTERVENTIONS:- Assess bowel function- Maintain adequate hydration with IV or  PO as ordered and tolerated- Evaluate effectiveness of GI medications- Encourage mobilization and activity- Obtain nutritional consult as needed- Establish a toileting routine/schedule- Consider collaborating with pharmacy to review patient's medication profile  Outcome: Progressing     Problem: POSTPARTUM  Goal: Long Term Goal:Experiences normal postpartum course  Description: INTERVENTIONS:- Assess and monitor vital signs and lab values.- Assess fundus and lochia.- Provide ice/sitz baths for perineum discomfort.- Monitor healing of incision/episiotomy/laceration, and assess for signs and symptoms of infection and hematoma.- Assess bladder function and monitor for bladder distention.- Provide/instruct/assist with pericare as needed.- Provide VTE prophylaxis as needed.- Monitor bowel function.- Encourage ambulation and provide assistance as needed.- Assess and monitor emotional status and provide social service/psych resources as needed.- Utilize standard precautions and use personal protective equipment as indicated. Ensure aseptic care of all intravenous lines and invasive tubes/drains.- Obtain immunization and exposure to communicable diseases history.  Outcome: Progressing  Goal: Optimize infant feeding at the breast  Description: INTERVENTIONS:- Initiate breast feeding within first hour after birth. - Monitor effectiveness of current breast feeding efforts.- Assess support systems available to mother/family.- Identify cultural beliefs/practices regarding lactation, letdown techniques, maternal food preferences.- Assess mother's knowledge and previous experience with breast feeding.- Provide information as needed about early infant feeding cues (e.g., rooting, lip smacking, sucking fingers/hand) versus late cue of crying.- Discuss/demonstrate breast feeding aids (e.g., infant sling, nursing footstool/pillows, and breast pumps).- Encourage mother/other family members to express feelings/concerns, and actively  listen.- Educate father/SO about benefits of breast feeding and how to manage common lactation challenges.- Recommend avoidance of specific medications or substances incompatible with breast feeding.- Assess and monitor for signs of nipple pain/trauma.- Instruct and provide assistance with proper latch.- Review techniques for milk expression (breast pumping) and storage of breast milk. Provide pumping equipment/supplies, instructions and assistance, as needed.- Encourage rooming-in and breast feeding on demand.- Encourage skin-to-skin contact.- Provide LC support as needed.- Assess for and manage engorgement.- Provide breast feeding education handouts and information on community breast feeding support.   Outcome: Progressing  Goal: Establishment of adequate milk supply with medication/procedure interruptions  Description: INTERVENTIONS:- Review techniques for milk expression (breast pumping). - Provide pumping equipment/supplies, instructions, and assistance until it is safe to breastfeed infant.  Outcome: Progressing  Goal: Experiences normal breast weaning course  Description: INTERVENTIONS:- Assess for and manage engorgement.- Instruct on breast care.- Provide comfort measures.  Outcome: Progressing  Goal: Appropriate maternal -  bonding  Description: INTERVENTIONS:- Assess caregiver- interactions.- Assess caregiver's emotional status and coping mechanisms.- Encourage caregiver to participate in  daily care.- Assess support systems available to mother/family.- Provide /case management support as needed.  Outcome: Progressing

## 2025-05-15 LAB
ALBUMIN SERPL-MCNC: 3.2 G/DL (ref 3.2–4.8)
ALBUMIN/GLOB SERPL: 1.3 {RATIO} (ref 1–2)
ALP LIVER SERPL-CCNC: 120 U/L (ref 37–98)
ALT SERPL-CCNC: 10 U/L (ref 10–49)
ANION GAP SERPL CALC-SCNC: 9 MMOL/L (ref 0–18)
AST SERPL-CCNC: 15 U/L (ref ?–34)
BILIRUB SERPL-MCNC: 0.3 MG/DL (ref 0.3–1.2)
BUN BLD-MCNC: 7 MG/DL (ref 9–23)
BUN/CREAT SERPL: 13 (ref 10–20)
CALCIUM BLD-MCNC: 8.1 MG/DL (ref 8.7–10.4)
CHLORIDE SERPL-SCNC: 107 MMOL/L (ref 98–112)
CO2 SERPL-SCNC: 24 MMOL/L (ref 21–32)
CREAT BLD-MCNC: 0.54 MG/DL (ref 0.55–1.02)
CREAT UR-SCNC: 88.7 MG/DL
DEPRECATED RDW RBC AUTO: 50.9 FL (ref 35.1–46.3)
EGFRCR SERPLBLD CKD-EPI 2021: 124 ML/MIN/1.73M2 (ref 60–?)
ERYTHROCYTE [DISTWIDTH] IN BLOOD BY AUTOMATED COUNT: 17.7 % (ref 11–15)
GLOBULIN PLAS-MCNC: 2.4 G/DL (ref 2–3.5)
GLUCOSE BLD-MCNC: 108 MG/DL (ref 70–99)
GLUCOSE BLDC GLUCOMTR-MCNC: 133 MG/DL (ref 70–99)
HCT VFR BLD AUTO: 34.9 % (ref 35–48)
HGB BLD-MCNC: 11.1 G/DL (ref 12–16)
MCH RBC QN AUTO: 25.1 PG (ref 26–34)
MCHC RBC AUTO-ENTMCNC: 31.8 G/DL (ref 31–37)
MCV RBC AUTO: 78.8 FL (ref 80–100)
OSMOLALITY SERPL CALC.SUM OF ELEC: 289 MOSM/KG (ref 275–295)
PLATELET # BLD AUTO: 238 10(3)UL (ref 150–450)
POTASSIUM SERPL-SCNC: 3.5 MMOL/L (ref 3.5–5.1)
PROT SERPL-MCNC: 5.6 G/DL (ref 5.7–8.2)
PROT UR-MCNC: 17.3 MG/DL (ref ?–14)
PROT/CREAT UR-RTO: 0.2
RBC # BLD AUTO: 4.43 X10(6)UL (ref 3.8–5.3)
SODIUM SERPL-SCNC: 140 MMOL/L (ref 136–145)
WBC # BLD AUTO: 12 X10(3) UL (ref 4–11)

## 2025-05-15 RX ORDER — HYDROCODONE BITARTRATE AND ACETAMINOPHEN 7.5; 325 MG/1; MG/1
1 TABLET ORAL EVERY 6 HOURS PRN
Refills: 0 | Status: DISCONTINUED | OUTPATIENT
Start: 2025-05-15 | End: 2025-05-16

## 2025-05-15 RX ORDER — HYDROCODONE BITARTRATE AND ACETAMINOPHEN 5; 325 MG/1; MG/1
1 TABLET ORAL EVERY 6 HOURS PRN
Refills: 0 | Status: DISCONTINUED | OUTPATIENT
Start: 2025-05-15 | End: 2025-05-16

## 2025-05-15 RX ORDER — HYDROCODONE BITARTRATE AND ACETAMINOPHEN 7.5; 325 MG/1; MG/1
1 TABLET ORAL EVERY 6 HOURS PRN
Refills: 0 | Status: DISCONTINUED | OUTPATIENT
Start: 2025-05-15 | End: 2025-05-15

## 2025-05-15 NOTE — PROGRESS NOTES
Bath emergency called at 0204 from patient's bathroom. Upon RN assessment, patient found sitting on toilet leaning on grab bar with head resting on arm. Patient determined to have had a syncopal episode. Patient able to regain awareness with sternal rub and ammonia. Patient alert and oriented x4, but seemingly very tired. Patient transferred to bed shortly after using Liliana Stedy. /85, HR 68, 98% on room air. Accucheck 133. Patient asleep in bed upon RN leaving room.

## 2025-05-15 NOTE — CM/SW NOTE
Sw received MDO for EPDS.   Sw f/up with Pt. Pt stated reason for high EPDS was being in pain. Sw discussed PMAD symptoms and signs w/ Pt and provided resources.    Resources: PMAD/Support Group, Stress management, and Depression and Anxiety    SW to remain available for support and/or discharge planning.    Sarah Garcia MSW, LSW   [id number]

## 2025-05-15 NOTE — PLAN OF CARE
Problem: Patient Centered Care  Goal: Patient preferences are identified and integrated in the patient's plan of care  Description: Interventions:- What would you like us to know as we care for you? - Provide timely, complete, and accurate information to patient/family- Incorporate patient and family knowledge, values, beliefs, and cultural backgrounds into the planning and delivery of care- Encourage patient/family to participate in care and decision-making at the level they choose- Honor patient and family perspectives and choices  Outcome: Progressing     Problem: Patient/Family Goals  Goal: Patient/Family Long Term Goal  Description: Patient's Long Term Goal: Interventions:- - See additional Care Plan goals for specific interventions  Outcome: Progressing  Goal: Patient/Family Short Term Goal  Description: Patient's Short Term Goal: Interventions: - - See additional Care Plan goals for specific interventions  Outcome: Progressing     Problem: GASTROINTESTINAL - ADULT  Goal: Minimal or absence of nausea and vomiting  Description: INTERVENTIONS:- Maintain adequate hydration with IV or PO as ordered and tolerated- Nasogastric tube to low intermittent suction as ordered- Evaluate effectiveness of ordered antiemetic medications- Provide nonpharmacologic comfort measures as appropriate- Advance diet as tolerated, if ordered- Obtain nutritional consult as needed- Evaluate fluid balance  Outcome: Progressing  Goal: Maintains or returns to baseline bowel function  Description: INTERVENTIONS:- Assess bowel function- Maintain adequate hydration with IV or PO as ordered and tolerated- Evaluate effectiveness of GI medications- Encourage mobilization and activity- Obtain nutritional consult as needed- Establish a toileting routine/schedule- Consider collaborating with pharmacy to review patient's medication profile  Outcome: Progressing  Goal: Maintains adequate nutritional intake (undernourished)  Description:  INTERVENTIONS:- Monitor percentage of each meal consumed- Identify factors contributing to decreased intake, treat as appropriate- Assist with meals as needed- Monitor I&O, WT and lab values- Obtain nutritional consult as needed- Optimize oral hygiene and moisture- Encourage food from home; allow for food preferences- Enhance eating environment  Outcome: Progressing  Goal: Achieves appropriate nutritional intake (bariatric)  Description: INTERVENTIONS:- Monitor for over-consumption- Identify factors contributing to increased intake, treat as appropriate- Monitor I&O, WT and lab values- Obtain nutritional consult as needed- Evaluate psychosocial factors contributing to over-consumption  Outcome: Progressing     Problem: POSTPARTUM  Goal: Long Term Goal:Experiences normal postpartum course  Description: INTERVENTIONS:- Assess and monitor vital signs and lab values.- Assess fundus and lochia.- Provide ice/sitz baths for perineum discomfort.- Monitor healing of incision/episiotomy/laceration, and assess for signs and symptoms of infection and hematoma.- Assess bladder function and monitor for bladder distention.- Provide/instruct/assist with pericare as needed.- Provide VTE prophylaxis as needed.- Monitor bowel function.- Encourage ambulation and provide assistance as needed.- Assess and monitor emotional status and provide social service/psych resources as needed.- Utilize standard precautions and use personal protective equipment as indicated. Ensure aseptic care of all intravenous lines and invasive tubes/drains.- Obtain immunization and exposure to communicable diseases history.  Outcome: Progressing  Goal: Optimize infant feeding at the breast  Description: INTERVENTIONS:- Initiate breast feeding within first hour after birth. - Monitor effectiveness of current breast feeding efforts.- Assess support systems available to mother/family.- Identify cultural beliefs/practices regarding lactation, letdown techniques,  maternal food preferences.- Assess mother's knowledge and previous experience with breast feeding.- Provide information as needed about early infant feeding cues (e.g., rooting, lip smacking, sucking fingers/hand) versus late cue of crying.- Discuss/demonstrate breast feeding aids (e.g., infant sling, nursing footstool/pillows, and breast pumps).- Encourage mother/other family members to express feelings/concerns, and actively listen.- Educate father/SO about benefits of breast feeding and how to manage common lactation challenges.- Recommend avoidance of specific medications or substances incompatible with breast feeding.- Assess and monitor for signs of nipple pain/trauma.- Instruct and provide assistance with proper latch.- Review techniques for milk expression (breast pumping) and storage of breast milk. Provide pumping equipment/supplies, instructions and assistance, as needed.- Encourage rooming-in and breast feeding on demand.- Encourage skin-to-skin contact.- Provide LC support as needed.- Assess for and manage engorgement.- Provide breast feeding education handouts and information on community breast feeding support.   Outcome: Progressing  Goal: Establishment of adequate milk supply with medication/procedure interruptions  Description: INTERVENTIONS:- Review techniques for milk expression (breast pumping). - Provide pumping equipment/supplies, instructions, and assistance until it is safe to breastfeed infant.  Outcome: Progressing  Goal: Appropriate maternal -  bonding  Description: INTERVENTIONS:- Assess caregiver- interactions.- Assess caregiver's emotional status and coping mechanisms.- Encourage caregiver to participate in  daily care.- Assess support systems available to mother/family.- Provide /case management support as needed.  Outcome: Progressing

## 2025-05-15 NOTE — PLAN OF CARE
Problem: Patient Centered Care  Goal: Patient preferences are identified and integrated in the patient's plan of care  Description: Interventions:- What would you like us to know as we care for you? Patient reports feeling a headache that started around midnight. Also had dizziness episode in bathroom around 3am. Norco given for pain around 0700 at the start of this RN's shift. BP reading with mild elevation but patient reported severe pain after walking from the bathroom. RN discussed waiting to make sure that pain medication has time to work and continue to assess throughout the day with the goal of resting, ambulating, and showering as tolerated. Encouraged to call RN for questions or assistance.    - Provide timely, complete, and accurate information to patient/family- Incorporate patient and family knowledge, values, beliefs, and cultural backgrounds into the planning and delivery of care- Encourage patient/family to participate in care and decision-making at the level they choose- Honor patient and family perspectives and choices  Outcome: Progressing     Problem: POSTPARTUM  Goal: Long Term Goal:Experiences normal postpartum course  Description: INTERVENTIONS:- Assess and monitor vital signs and lab values.- Assess fundus and lochia.- Provide ice/sitz baths for perineum discomfort.- Monitor healing of incision/episiotomy/laceration, and assess for signs and symptoms of infection and hematoma.- Assess bladder function and monitor for bladder distention.- Provide/instruct/assist with pericare as needed.- Provide VTE prophylaxis as needed.- Monitor bowel function.- Encourage ambulation and provide assistance as needed.- Assess and monitor emotional status and provide social service/psych resources as needed.- Utilize standard precautions and use personal protective equipment as indicated. Ensure aseptic care of all intravenous lines and invasive tubes/drains.- Obtain immunization and exposure to communicable  diseases history.  Outcome: Progressing     Problem: POSTPARTUM  Goal: Optimize infant feeding at the breast  Description: INTERVENTIONS:- Initiate breast feeding within first hour after birth. - Monitor effectiveness of current breast feeding efforts.- Assess support systems available to mother/family.- Identify cultural beliefs/practices regarding lactation, letdown techniques, maternal food preferences.- Assess mother's knowledge and previous experience with breast feeding.- Provide information as needed about early infant feeding cues (e.g., rooting, lip smacking, sucking fingers/hand) versus late cue of crying.- Discuss/demonstrate breast feeding aids (e.g., infant sling, nursing footstool/pillows, and breast pumps).- Encourage mother/other family members to express feelings/concerns, and actively listen.- Educate father/SO about benefits of breast feeding and how to manage common lactation challenges.- Recommend avoidance of specific medications or substances incompatible with breast feeding.- Assess and monitor for signs of nipple pain/trauma.- Instruct and provide assistance with proper latch.- Review techniques for milk expression (breast pumping) and storage of breast milk. Provide pumping equipment/supplies, instructions and assistance, as needed.- Encourage rooming-in and breast feeding on demand.- Encourage skin-to-skin contact.- Provide LC support as needed.- Assess for and manage engorgement.- Provide breast feeding education handouts and information on community breast feeding support.   Outcome: Progressing     Problem: POSTPARTUM  Goal: Establishment of adequate milk supply with medication/procedure interruptions  Description: INTERVENTIONS:- Review techniques for milk expression (breast pumping). - Provide pumping equipment/supplies, instructions, and assistance until it is safe to breastfeed infant.  Outcome: Progressing     Problem: POSTPARTUM  Goal: Appropriate maternal -   bonding  Description: INTERVENTIONS:- Assess caregiver- interactions.- Assess caregiver's emotional status and coping mechanisms.- Encourage caregiver to participate in  daily care.- Assess support systems available to mother/family.- Provide /case management support as needed.  Outcome: Progressing

## 2025-05-15 NOTE — PROGRESS NOTES
[facility]  Obstetrics and Gynecology    OB/GYN: Postpartum Progress Note     SUBJECTIVE:  Patient is a 34 year old  female who is s/p [facility]. She is POD# 2.   Doing well. Noted still with significant pain at incision site. Also c/o headache.. Denies fever, chills, N, V, chest pain and SOB. Bleeding has been stable.  Voiding without difficulty.  Passing flatus. Hasn't eaten much. Ambulating without difficulty.     OBJECTIVE:  Vitals:    05/15/25 0515 05/15/25 0530 05/15/25 0801 05/15/25 1033   BP:   140/82 129/85   Pulse: 79 67 62 71   Resp:   15 16   Temp:   97.1 °F (36.2 °C)    TempSrc:   Axillary    SpO2: 97% 98%     Weight:           Physical Exam:  Lungs:   Respirations non labored   Cardiovascular:   Peripheral pulses +2 bilaterally      General:    AAA. NAD.    Abdomen Soft, nontender, nondistended,   Lochia:  appropriate   Uterine Fundus:   firm at the umbilicus   Incision:  healing well, no significant drainage, no dehiscence, no significant erythema with tegaderm in place    DVT Evaluation:  No evidence of DVT seen on physical exam.  Negative Tara's sign.  No cords or calf tenderness.  No significant calf/ankle edema.         Labs:  Recent Labs   Lab 25  0533   RBC 4.53   HGB 11.4*   HCT 35.5   MCV 78.4*   MCH 25.2*   MCHC 32.1   RDW 17.5*   NEPRELIM 10.77*   WBC 13.3*   .0       ASSESSMENT/PLAN:  Patient is a 34 year old  female who is s/p [facility] POD# 2.     Elevated blood pressure - preeclampsia labs negative. Headache has improved. Will follow. Bps improving    Continue routine postpartum care  Vitals per routine   Encourage ambulation and IS use   Plan for discharge to home likely tomorrow      Lisa Albovias,  MD    [facility]

## 2025-05-15 NOTE — LACTATION NOTE
05/15/25 1700   Evaluation Type   Evaluation Type Inpatient   Problems identified   Problems identified Knowledge deficit;Milk supply not WNL   Milk supply not WNL Reduced (potential)   Problems Identified Other Formula supplementation   Maternal history   Maternal history Obesity;Caesarean section   Breastfeeding goal   Breastfeeding goal To maintain breast milk feeding per patient goal   Maternal Assessment   Prior breastfeeding experience (comment below) Multip;Successful   Prior BF experience: comment  first child for 2 years.   Breastfeeding Assistance LC assistance declined at this time   Guidelines for use of:   Suggested use of pump Pump each time a supplement is offered;Pump if infant is not latching to breast   Other (comment) Mom states that baby does not latch to left side and that the baby is not satisfied with breast milk alone. Supplemental formula given. Reassurance given to mother. Offered breastfeeding observation. Will follow up for next feeding.

## 2025-05-15 NOTE — LACTATION NOTE
05/15/25 9040   Evaluation Type   Evaluation Type Inpatient   Problems identified   Problems identified Knowledge deficit   Milk supply not WNL Reduced (potential)   Problems Identified Other Formula supplementation   Maternal history   Maternal history Obesity;Caesarean section   Breastfeeding goal   Breastfeeding goal To maintain breast milk feeding per patient goal   Maternal Assessment   Bilateral Breasts Soft;Symmetrical   Bilateral Nipples Everted;Colostrum easily expressed;Elastic   Prior breastfeeding experience (comment below) Multip;Successful   Prior BF experience: comment  first child for 2 years.   Breastfeeding Assistance Breastfeeding assistance provided with permission;Hand expression provided with permission   Pain assessment   Location/Comment C/S pain and uterine cramping with breastfeeding.   Guidelines for use of:   Suggested use of pump Pump if infant is not latching to breast;Pump each time a supplement is offered   Other (comment) Assisted with latch on left side in both football and cross cradle positions. Colostrum very easily expressed. Assisted with baby's positioning and body alignment and she was able to latch and maintain. Mom denies pain with breastfeeding.

## 2025-05-16 VITALS
TEMPERATURE: 98 F | RESPIRATION RATE: 18 BRPM | OXYGEN SATURATION: 98 % | BODY MASS INDEX: 37 KG/M2 | HEART RATE: 62 BPM | DIASTOLIC BLOOD PRESSURE: 86 MMHG | SYSTOLIC BLOOD PRESSURE: 121 MMHG | WEIGHT: 235 LBS

## 2025-05-16 RX ORDER — GABAPENTIN 300 MG/1
300 CAPSULE ORAL 3 TIMES DAILY
Qty: 21 CAPSULE | Refills: 0 | Status: SHIPPED | OUTPATIENT
Start: 2025-05-16 | End: 2025-05-23

## 2025-05-16 RX ORDER — POLYETHYLENE GLYCOL 3350 17 G/17G
17 POWDER, FOR SOLUTION ORAL 2 TIMES DAILY PRN
Qty: 60 EACH | Refills: 0 | Status: SHIPPED | OUTPATIENT
Start: 2025-05-16 | End: 2025-06-15

## 2025-05-16 RX ORDER — ACETAMINOPHEN 325 MG/1
325 TABLET ORAL EVERY 6 HOURS PRN
Qty: 60 TABLET | Refills: 0 | Status: SHIPPED | OUTPATIENT
Start: 2025-05-16

## 2025-05-16 RX ORDER — DOCUSATE SODIUM 100 MG/1
100 CAPSULE, LIQUID FILLED ORAL 2 TIMES DAILY PRN
Qty: 60 CAPSULE | Refills: 0 | Status: SHIPPED | OUTPATIENT
Start: 2025-05-16 | End: 2025-06-15

## 2025-05-16 RX ORDER — IBUPROFEN 600 MG/1
600 TABLET, FILM COATED ORAL EVERY 6 HOURS PRN
Qty: 60 TABLET | Refills: 0 | Status: SHIPPED | OUTPATIENT
Start: 2025-05-16

## 2025-05-16 NOTE — PROGRESS NOTES
[facility]  Obstetrics and Gynecology    OB/GYN: Postpartum Progress Note     SUBJECTIVE:  Patient is a 34 year old  female who is s/p [facility]. She is POD# 3.   Doing well. Noted no more dissiness or lightheadedness with ambulation. Desires discharge home today. Denies fever, chills, N, V, chest pain and SOB. Bleeding has been stable.  Voiding without difficulty.  Passing flatus.  No Bm. Tolerating general diet. Ambulating without difficulty.     OBJECTIVE:  Vitals:    05/15/25 0530 05/15/25 0801 05/15/25 1033 05/15/25 2122   BP:  140/82 129/85 126/83   Pulse: 67 62 71 77   Resp:  15 16 18   Temp:  97.1 °F (36.2 °C)  98.2 °F (36.8 °C)   TempSrc:  Axillary  Axillary   SpO2: 98%      Weight:           Physical Exam:  Lungs:   Respirations non labored   Cardiovascular:   Peripheral pulses +2 bilaterally      General:    AAA. NAD.    Abdomen Soft, nontender, nondistended   Lochia:  appropriate   Uterine Fundus:   firm at the umbilicus   Incision:  healing well, no significant drainage, no dehiscence, no significant erythema with Tegaderm in place    DVT Evaluation:  No evidence of DVT seen on physical exam.  Negative Tara's sign.  No cords or calf tenderness.  No significant calf/ankle edema.     Urinary output is adequate. (When recorded)    Labs:  Recent Labs   Lab 25  0533 05/15/25  1051   RBC 4.53 4.43   HGB 11.4* 11.1*   HCT 35.5 34.9*   MCV 78.4* 78.8*   MCH 25.2* 25.1*   MCHC 32.1 31.8   RDW 17.5* 17.7*   NEPRELIM 10.77*  --    WBC 13.3* 12.0*   .0 238.0       ASSESSMENT/PLAN:  Patient is a 34 year old  female who is s/p repeat  section POD# 3.     Doing well   Continue routine postpartum care  Vitals per routine   Encourage ambulation and IS use   Plan for discharge to home today.   Follow up in 2&6 weeks        Dr. Jersey Rey MD    [facility]     This note was created by iogyn voice recognition. Errors in content may be related to improper recognition  by the system; efforts to review and correct have been done but errors may still exist. Please be advised the primary purpose of this note is for me to communicate medical care. Standard sentence structure is not always used. Medical terminology and medical abbreviations may be used. There may be grammatical, typographical, and automated fill ins that may have errors missed in proofreading.

## 2025-05-16 NOTE — PLAN OF CARE
Problem: PAIN - ADULT  Goal: Verbalizes/displays adequate comfort level or patient's stated pain goal  Description: INTERVENTIONS:- Encourage pt to monitor pain and request assistance- Assess pain using appropriate pain scale- Administer analgesics based on type and severity of pain and evaluate response- Implement non-pharmacological measures as appropriate and evaluate response- Consider cultural and social influences on pain and pain management- Manage/alleviate anxiety- Utilize distraction and/or relaxation techniques- Monitor for opioid side effects- Notify MD/LIP if interventions unsuccessful or patient reports new pain- Anticipate increased pain with activity and pre-medicate as appropriate  INTERVENTIONS:- Encourage pt to monitor pain and request assistance- Assess pain using appropriate pain scale- Administer analgesics based on type and severity of pain and evaluate response- Implement non-pharmacological measures as appropriate and evaluate response- Consider cultural and social influences on pain and pain management- Manage/alleviate anxiety- Utilize distraction and/or relaxation techniques- Monitor for opioid side effects- Notify MD/LIP if interventions unsuccessful or patient reports new pain- Anticipate increased pain with activity and pre-medicate as appropriate  Outcome: Progressing     Problem: Patient Centered Care  Goal: Patient preferences are identified and integrated in the patient's plan of care  Description: Interventions:- What would you like us to know as we care for you? - Provide timely, complete, and accurate information to patient/family- Incorporate patient and family knowledge, values, beliefs, and cultural backgrounds into the planning and delivery of care- Encourage patient/family to participate in care and decision-making at the level they choose- Honor patient and family perspectives and choices  Outcome: Progressing     Problem: GASTROINTESTINAL - ADULT  Goal: Minimal or  absence of nausea and vomiting  Description: INTERVENTIONS:- Maintain adequate hydration with IV or PO as ordered and tolerated- Nasogastric tube to low intermittent suction as ordered- Evaluate effectiveness of ordered antiemetic medications- Provide nonpharmacologic comfort measures as appropriate- Advance diet as tolerated, if ordered- Obtain nutritional consult as needed- Evaluate fluid balance  Outcome: Progressing  Goal: Maintains or returns to baseline bowel function  Description: INTERVENTIONS:- Assess bowel function- Maintain adequate hydration with IV or PO as ordered and tolerated- Evaluate effectiveness of GI medications- Encourage mobilization and activity- Obtain nutritional consult as needed- Establish a toileting routine/schedule- Consider collaborating with pharmacy to review patient's medication profile  Outcome: Progressing  Goal: Maintains adequate nutritional intake (undernourished)  Description: INTERVENTIONS:- Monitor percentage of each meal consumed- Identify factors contributing to decreased intake, treat as appropriate- Assist with meals as needed- Monitor I&O, WT and lab values- Obtain nutritional consult as needed- Optimize oral hygiene and moisture- Encourage food from home; allow for food preferences- Enhance eating environment  Outcome: Progressing  Goal: Achieves appropriate nutritional intake (bariatric)  Description: INTERVENTIONS:- Monitor for over-consumption- Identify factors contributing to increased intake, treat as appropriate- Monitor I&O, WT and lab values- Obtain nutritional consult as needed- Evaluate psychosocial factors contributing to over-consumption  Outcome: Progressing     Problem: POSTPARTUM  Goal: Optimize infant feeding at the breast  Description: INTERVENTIONS:- Initiate breast feeding within first hour after birth. - Monitor effectiveness of current breast feeding efforts.- Assess support systems available to mother/family.- Identify cultural beliefs/practices  regarding lactation, letdown techniques, maternal food preferences.- Assess mother's knowledge and previous experience with breast feeding.- Provide information as needed about early infant feeding cues (e.g., rooting, lip smacking, sucking fingers/hand) versus late cue of crying.- Discuss/demonstrate breast feeding aids (e.g., infant sling, nursing footstool/pillows, and breast pumps).- Encourage mother/other family members to express feelings/concerns, and actively listen.- Educate father/SO about benefits of breast feeding and how to manage common lactation challenges.- Recommend avoidance of specific medications or substances incompatible with breast feeding.- Assess and monitor for signs of nipple pain/trauma.- Instruct and provide assistance with proper latch.- Review techniques for milk expression (breast pumping) and storage of breast milk. Provide pumping equipment/supplies, instructions and assistance, as needed.- Encourage rooming-in and breast feeding on demand.- Encourage skin-to-skin contact.- Provide LC support as needed.- Assess for and manage engorgement.- Provide breast feeding education handouts and information on community breast feeding support.   Outcome: Progressing  Goal: Establishment of adequate milk supply with medication/procedure interruptions  Description: INTERVENTIONS:- Review techniques for milk expression (breast pumping). - Provide pumping equipment/supplies, instructions, and assistance until it is safe to breastfeed infant.  Outcome: Progressing  Goal: Appropriate maternal -  bonding  Description: INTERVENTIONS:- Assess caregiver- interactions.- Assess caregiver's emotional status and coping mechanisms.- Encourage caregiver to participate in  daily care.- Assess support systems available to mother/family.- Provide /case management support as needed.  Outcome: Progressing

## 2025-05-16 NOTE — LACTATION NOTE
This note was copied from a baby's chart.     05/16/25 0800   Evaluation Type   Evaluation Type Inpatient   Problems & Assessment   Problems Diagnosed or Identified Tongue restriction;Latch difficulty   Problems: comment/detail LGA, latch difficulty on left side. Tight lingual frenulum noted.   Infant Assessment Oral mucous membranes moist;Hunger cues present;Skin color: jaundice   Muscle tone Appropriate for GA   Feeding Assessment   Summary Current Feeding Breastfeeding with breast milk supplement;Breastfeeding with formula supplement   Last 24 hour feeding summary Breastfeeding mostly from the right side. Pumping afterwards.   Breastfeeding Assessment Assisted with breastfeeding w/mother's permission;Calm and ready to breastfeed;Coordinated suck/swallow;No sustained latch to breast;PO supplement followed breastfeeding;Sustained nutrititive latch w/audible swallows  (No sustained latch to left breast. Able to maintain latch on right. Audible swallows on right side.)   Breastfeeding lasted # of minutes 20   Breastfeeding Positions cradle   Latch 2   Audible Sucks/Swallows 2   Type of Nipple 2   Comfort (Breast/Nipple) 1   Hold (Positioning) 1   LATCH Score 8   Other (comment) Baby having difficulty maintaining latch on left side. Mom mainly feeding from right side. Baby noted to have \"chomping\"  up and down jaw movements rather than pulling jaw movements. Reiterated to mother lactation follow-up post discharge to evaluate baby's milk transfer.   Output   # Voids in 24 hours see I &Os   Pre/Post Weights   Supplement Type EBM/Formula

## 2025-05-16 NOTE — DISCHARGE SUMMARY
[facility]  part of [facility]    Discharge Summary    Melia Mendoza Patient Status:  Inpatient    12/3/1990 MRN [id number]   Location [facility] 3SE Attending Lisa Callejas MD   Hosp Day # 3 PCP No primary care provider on file.     Date of Admission: 2025    Date of Discharge: 25     Admission Diagnoses: repeat hx 3rd degree laceration   Pregnancy (HCC) at 39w2d     Secondary Diagnosis:   Problem List[1]     Primary OB Clinician: [facility]    Hospital Course:     EDC: Estimated Date of Delivery: 25    Gestational Age: 39w2d    Date of Delivery: 25    Antepartum complications: as above problem lis    Delivered By: Dr. Lisa Callejas  Delivery Type: Repeat  section     Tubal Ligation: n/a    Baby: Liveborn female,     Apgars:  1 minute:   9                 5 minutes: 9                             Anesthesia: spinal      Surgical Procedures       Case IDs Date Procedure Surgeon Location Status    [id number] 25  SECTION Lisa Callejas MD EM L+D OR Comp            Intrapartum Complications: None    Laceration: n/a    Episiotomy: none    Placenta: manual removal    Feeding Method: breast fed    Rh Immune Globulin Given: no    Rubella Vaccine Given: no        Discharge Plan:   Discharge Condition: Good  Early Discharge:  NO    Discharge medications:  Current Discharge Medication List        New Orders    Details   ibuprofen 600 MG Oral Tab Take 1 tablet (600 mg total) by mouth every 6 (six) hours as needed for Pain.      acetaminophen 325 MG Oral Tab Take 1 tablet (325 mg total) by mouth every 6 (six) hours as needed for Pain.      docusate sodium 100 MG Oral Cap Take 1 capsule (100 mg total) by mouth 2 (two) times daily as needed.      polyethylene glycol, PEG 3350, 17 g Oral Powd Pack Take 17 g by mouth 2 (two) times daily as needed.      gabapentin 300 MG Oral Cap Take 1 capsule (300 mg total) by mouth in the morning, at noon, and at  bedtime for 7 days.           Home Meds - Unchanged    Details   prenatal vitamin with DHA 27-0.8-228 MG Oral Cap Take 1 capsule by mouth daily.      Ferrous Sulfate 325 (65 Fe) MG Oral Tab Take 1 tablet (325 mg total) by mouth every other day.      folic acid 800 MCG Oral Tab Take 1 tablet (800 mcg total) by mouth in the morning.      docusate sodium 100 MG Oral Tab Take 1 tablet (100 mg total) by mouth 2 (two) times daily.                   Discharge Diet: As tolerated and General diet    Discharge Activity: Pelvic rest until cleared    Follow up:      Follow-up Information       [facility]. Call.    Specialty: Pediatrics  Why: As needed  Contact information:  [address] [facility] [id number] [phone]  Additional information:  Masks are optional for all patients and visitors, unless otherwise indicated.             Lisa Callejas MD. Schedule an appointment as soon as possible for a visit in 2 week(s).    Specialty: OBSTETRICS & GYNECOLOGY  Why: Incision check  Contact information:  [address] [place] [address] [phone]               Lisa Callejas MD. Schedule an appointment as soon as possible for a visit in 6 week(s).    Specialty: OBSTETRICS & GYNECOLOGY  Why: Routine postpartum visit  Contact information:  [address] [place] [address] [phone]                             Follow up Labs: None    Other Discharge Instructions:           Post  Section Home Care Instructions     We hope you were pleased with your care at [facility].  We wish you the best outcome and overall experience with the delivery of your baby.  These instructions will help to minimize pain, limit the risk for an infection, and improve the likelihood of a successful recovery.    What to Expect:  Abdominal cramping after delivery especially if you are breastfeeding.   Vaginal bleeding for about 4-6 weeks that may be followed by a yellow  or white discharge for a few more weeks.  Your period will resume in approximately 6-8 weeks, unless you are breastfeeding.    If you are bottle feeding, you may notice breast engorgement in about 3 days.  Your breast may be sore and hard. Please wear a tight fitted bra or sports bra for 24-36 hours to help prevent your breast from producing milk, and use ice packs to relive any discomfort.  If you are breastfeeding, nipple dryness is very common the first few days.    Constipation is common after having a baby.  Please increase fluid and fiber in your diet.      Over-The-Counter Medication  Non-prescription anti-inflammatory medications can also help to ease the pain.  You may take Aleve, Tylenol or Ibuprofen   Colace or Metamucil for Constipation  Lanolin for dry nipples  Tucks, Witch Hazel and Epifoam for vaginal/perineum discomfort.   Drink a full glass of water with oral medication and take as directed.    Wound Care  The following instructions will promote proper healing and help to prevent infection  Please use soap and water over incision   Pat your incision dry and leave open to air if possible   If you have steri - strips, then please remove after 4-5 days from your surgery. You may remove after a shower to decrease discomfort.   Do not replace the Steri-Strips, if they come off.  If the tapes come off, leave them off and keep the incision clean.  You do not need to cover the incision or put any medications on the incision.    Bathing/Showers  You may resume showers  No baths, swimming, hot tubs until your post-partum visit    Home Medication  Resume your home medications as instructed    Diet  Resume your normal diet    Activity  Refrain from vaginal intercourse, vaginal suppositories, tampon use or douches until after your post-partum visit  No exercising for 4 weeks  You may climb stairs minimally for the 1st week.    Do not do heavy housework for at least 2-3 weeks    Return to Work or School  You may  return to work in 6-8 weeks  Contact your obstetrician’s office, if you need a medical release. ([phone])    Driving  Avoid driving for 1-2 weeks or sooner if not taking narcotics.    Follow-up Appointment with Your Obstetrician  Call your obstetrician’s office today for an appointment in four weeks.    The number is [phone].  Verify your appointment date, day, time, and location.  At your 1st post-partum office visit:  Your progress will be evaluated, findings reviewed, and any additional concerns and instructions will be discussed.    Questions or Concerns  Call your obstetrician’s office if you experience the following:  Severe pain not controlled by pain medication  Foul smelling vaginal discharge  Heavy bleeding  Shortness of breath  Fever  Redness, increased swelling or drainage from your incision  Crying and periods of sadness that prevents you from caring for yourself and your baby  Burning sensation during urination or inability to urinate  Swelling, redness or abnormal warmth to your leg/calf  Please call [phone]. If your call is made after office hours, a physician will be available to help you.  There is always a provider covering our patients.    Thank you for coming to [facility] to start your new family.  The nurses, obstetricians, and the anesthesiologists try very hard to make sure you receive the best care possible.  Your trust in them as well as us is greatly appreciated.    Thanks so much,   The Providers of [facility] Obstetric[facility] Gynecology    [facility] has great support for our families even after discharge.  We have virtual or in-person support groups.  Visit our website for the most up-to-date info for our many different support groups. https://www.health.org/services/pregnancy-baby/resources/       Outpatient Lactation appointments:  Call [phone]- to schedule an appt.  Our office is located in the Maternal Fetal Medicine office next to [facility] on  the first floor.        Support Groups: Moms-to-be are also welcome! All mom's welcome even if its not your first.     MOM & BABY HOUR- Every new mom can use some support in the exciting but challenging adjustment to motherhood. Join us for Mom and Baby Hour where an experienced nurse and lactation consultant will guide conversations and answer questions and provide breastfeeding support.  Most weeks we will also have a guest speaker to present information on many different parenting topics. We welcome mothers and their infants (up to crawling), dad, grandmas, and others to join our group.    Meets most  10:00 - 11:30 a.m.  Masks are not required, but be considerate of others and do not attend if mom or baby have had any symptoms of illness within the previous 24 hours. Location [facility] [place] [address] [place] Go inside the front door and to the right to the “Community Education Room”.      Nurturing Mom- A support group for new and expectant moms looking for support with the transition to parenthood as well as those experiencing symptoms of  anxiety and/or depression.  Please contact [email or web address] if you need directions or the link for the virtual meetings. Please contact [email or web address] if you plan to attend, but please be considerate of others and do not attend if mom or baby have had any symptoms of illness within the previous 24 hours.       Mom's Line: [phone]  A phone line dedicated for women (or anyone worried about a women) who may be experiencing signs or symptoms of postpartum depression, anxiety, or overwhelmed with new baby. Call - answered by live trained mental health professionals, free and confidential, emotional support, referrals, in any language.    La Leche League for breast feeding and parent support, Website: IIIi.org  and for the [place] group and other groups visit  https://www.[employer].com/pg/Mynor/events/    Facebook groups-  for more support when home- Babies & Mommies of [facility] --- you can find mom-to-mom advice and the list of speaker topics for cradle talk program.  Telephone Support  Postpartum depression [place] [facility] [place] (www.ppdil.org)  -Free information and support for pregnant and postpartum women with symptoms of depression, anxiety  -Mom-to-mom support from volunteers who have been through depression    Telephone support: ([phone]  Urgent [phone])-[phone] (answered 24/7)  Email support: support@ppdil.org    Postpartum Support International (www.postpartum.net)  -Free phone conversation with trained volunteers   (469) 649-2149; after the prompt enter 11209#    National Postpartum Depression Hotline  (848)-PPD-MOMS    Helpful websites:    www.[employer].Salsify  www.[employer]  www.Breastfeed[place].org    Initiation of breast pumping after discharge:     - Add 1 pumping session a day, additional to the infant's feedings, 2-3 weeks after delivery.     - Pump both breasts for 15 mins, immediately after a breast feeding session.    - Pumping first thing in the morning will provide greater output.    - If you chose to pump more than once a day, you should be consistent every day to prevent a breast infection.      - Pump using an electric pump over a hands free pump (electric pumps provided stronger stimulation to the nipples).    - Store the breast milk in 2-3 oz containers.     - Label containers with date and time.    - Always feed oldest milk first.                         Dr. Jersey Rey MD    [facility]     This note was created by [employer] voice recognition. Errors in content may be related to improper recognition by the system; efforts to review and correct have been done but errors may still exist. Please be advised the primary purpose of this note is for me to communicate medical care. Standard sentence structure is not always used.  Medical terminology and medical abbreviations may be used. There may be grammatical, typographical, and automated fill ins that may have errors missed in proofreading.          [1]   Patient Active Problem List  Diagnosis    Previous  section    Obesity in pregnancy ([facility])    Preferred language is Greenlandic    History of macrosomia in infant in prior pregnancy, currently pregnant ([facility])     (vaginal birth after ) ([facility])    Third degree laceration of perineum during delivery, postpartum ([facility])    Child with Down syndrome    Declines vaginal birth after  trial ([facility])    Shortness of breath during pregnancy ([facility])    Pregnancy ([facility])

## 2025-05-16 NOTE — LACTATION NOTE
05/16/25 0800   Evaluation Type   Evaluation Type Inpatient   Problems identified   Problems identified Knowledge deficit;Milk supply not WNL   Milk supply not WNL Reduced (potential)   Problems Identified Other Baby not latching on left side. Formula supplementation   Maternal history   Maternal history Obesity;Caesarean section   Breastfeeding goal   Breastfeeding goal To maintain breast milk feeding per patient goal   Maternal Assessment   Bilateral Breasts Filling;Symmetrical   Bilateral Nipples Everted;Colostrum easily expressed;Elastic   Left Nipple Thick/dense   Prior breastfeeding experience (comment below) Multip;Successful   Prior BF experience: comment  first child for 2 years.   Breastfeeding Assistance Breastfeeding assistance provided with permission;Hand expression provided with permission   Pain assessment   Location/Comment C/S pain and uterine cramping with breastfeeding.   Treatment of Sore Nipples Deeper latch techniques   Guidelines for use of:   Breast pump type Hand Pump;Ameda Platinum   Current use of pump: Pumping left side if baby is not latching to that side.   Suggested use of pump Pump if infant is not latching to breast;Pump each time a supplement is offered   Other (comment) Mom reports that baby is not latching on left side. Assisted with latch on left side. Multiple attempts made but baby not maintaining latch and slipping off nipple. Areolar edema noted and reverse pressure softening done. Observed baby nursing on right side and baby is able to maintain latch but has have more up and down jaw movement.  Discussed pumping left breast if baby is not sustaining latch on left side and feeding that milk to baby via bottle. Reviewed breastfeeding discharge instructions including feeding frequency, monitoring diaper output, supplementation guidelines. Discussed how baby's tongue restriction may interfere with breastfeeding and recommend mom discuss with baby's doctor.   Encouraged pediatrician f/u tomorrow.

## 2025-05-16 NOTE — PLAN OF CARE
Problem: PAIN - ADULT  Goal: Verbalizes/displays adequate comfort level or patient's stated pain goal  Description: INTERVENTIONS:- Encourage pt to monitor pain and request assistance- Assess pain using appropriate pain scale- Administer analgesics based on type and severity of pain and evaluate response- Implement non-pharmacological measures as appropriate and evaluate response- Consider cultural and social influences on pain and pain management- Manage/alleviate anxiety- Utilize distraction and/or relaxation techniques- Monitor for opioid side effects- Notify MD/LIP if interventions unsuccessful or patient reports new pain- Anticipate increased pain with activity and pre-medicate as appropriate  INTERVENTIONS:- Encourage pt to monitor pain and request assistance- Assess pain using appropriate pain scale- Administer analgesics based on type and severity of pain and evaluate response- Implement non-pharmacological measures as appropriate and evaluate response- Consider cultural and social influences on pain and pain management- Manage/alleviate anxiety- Utilize distraction and/or relaxation techniques- Monitor for opioid side effects- Notify MD/LIP if interventions unsuccessful or patient reports new pain- Anticipate increased pain with activity and pre-medicate as appropriate  Outcome: Adequate for Discharge     Problem: Patient Centered Care  Goal: Patient preferences are identified and integrated in the patient's plan of care  Description: Interventions:- What would you like us to know as we care for you? - Provide timely, complete, and accurate information to patient/family- Incorporate patient and family knowledge, values, beliefs, and cultural backgrounds into the planning and delivery of care- Encourage patient/family to participate in care and decision-making at the level they choose- Honor patient and family perspectives and choices  Outcome: Adequate for Discharge     Problem: Patient/Family  Goals  Goal: Patient/Family Long Term Goal  Description: Patient's Long Term Goal: Interventions:- - See additional Care Plan goals for specific interventions  Outcome: Adequate for Discharge  Goal: Patient/Family Short Term Goal  Description: Patient's Short Term Goal: Interventions: - - See additional Care Plan goals for specific interventions  Outcome: Adequate for Discharge     Problem: GASTROINTESTINAL - ADULT  Goal: Minimal or absence of nausea and vomiting  Description: INTERVENTIONS:- Maintain adequate hydration with IV or PO as ordered and tolerated- Nasogastric tube to low intermittent suction as ordered- Evaluate effectiveness of ordered antiemetic medications- Provide nonpharmacologic comfort measures as appropriate- Advance diet as tolerated, if ordered- Obtain nutritional consult as needed- Evaluate fluid balance  Outcome: Adequate for Discharge  Goal: Maintains or returns to baseline bowel function  Description: INTERVENTIONS:- Assess bowel function- Maintain adequate hydration with IV or PO as ordered and tolerated- Evaluate effectiveness of GI medications- Encourage mobilization and activity- Obtain nutritional consult as needed- Establish a toileting routine/schedule- Consider collaborating with pharmacy to review patient's medication profile  Outcome: Adequate for Discharge  Goal: Maintains adequate nutritional intake (undernourished)  Description: INTERVENTIONS:- Monitor percentage of each meal consumed- Identify factors contributing to decreased intake, treat as appropriate- Assist with meals as needed- Monitor I&O, WT and lab values- Obtain nutritional consult as needed- Optimize oral hygiene and moisture- Encourage food from home; allow for food preferences- Enhance eating environment  Outcome: Adequate for Discharge  Goal: Achieves appropriate nutritional intake (bariatric)  Description: INTERVENTIONS:- Monitor for over-consumption- Identify factors contributing to increased intake, treat as  appropriate- Monitor I&O, WT and lab values- Obtain nutritional consult as needed- Evaluate psychosocial factors contributing to over-consumption  Outcome: Adequate for Discharge     Problem: POSTPARTUM  Goal: Long Term Goal:Experiences normal postpartum course  Description: INTERVENTIONS:- Assess and monitor vital signs and lab values.- Assess fundus and lochia.- Provide ice/sitz baths for perineum discomfort.- Monitor healing of incision/episiotomy/laceration, and assess for signs and symptoms of infection and hematoma.- Assess bladder function and monitor for bladder distention.- Provide/instruct/assist with pericare as needed.- Provide VTE prophylaxis as needed.- Monitor bowel function.- Encourage ambulation and provide assistance as needed.- Assess and monitor emotional status and provide social service/psych resources as needed.- Utilize standard precautions and use personal protective equipment as indicated. Ensure aseptic care of all intravenous lines and invasive tubes/drains.- Obtain immunization and exposure to communicable diseases history.  Outcome: Adequate for Discharge  Goal: Optimize infant feeding at the breast  Description: INTERVENTIONS:- Initiate breast feeding within first hour after birth. - Monitor effectiveness of current breast feeding efforts.- Assess support systems available to mother/family.- Identify cultural beliefs/practices regarding lactation, letdown techniques, maternal food preferences.- Assess mother's knowledge and previous experience with breast feeding.- Provide information as needed about early infant feeding cues (e.g., rooting, lip smacking, sucking fingers/hand) versus late cue of crying.- Discuss/demonstrate breast feeding aids (e.g., infant sling, nursing footstool/pillows, and breast pumps).- Encourage mother/other family members to express feelings/concerns, and actively listen.- Educate father/SO about benefits of breast feeding and how to manage common lactation  challenges.- Recommend avoidance of specific medications or substances incompatible with breast feeding.- Assess and monitor for signs of nipple pain/trauma.- Instruct and provide assistance with proper latch.- Review techniques for milk expression (breast pumping) and storage of breast milk. Provide pumping equipment/supplies, instructions and assistance, as needed.- Encourage rooming-in and breast feeding on demand.- Encourage skin-to-skin contact.- Provide LC support as needed.- Assess for and manage engorgement.- Provide breast feeding education handouts and information on community breast feeding support.   Outcome: Adequate for Discharge  Goal: Establishment of adequate milk supply with medication/procedure interruptions  Description: INTERVENTIONS:- Review techniques for milk expression (breast pumping). - Provide pumping equipment/supplies, instructions, and assistance until it is safe to breastfeed infant.  Outcome: Adequate for Discharge  Goal: Experiences normal breast weaning course  Description: INTERVENTIONS:- Assess for and manage engorgement.- Instruct on breast care.- Provide comfort measures.  Outcome: Adequate for Discharge  Goal: Appropriate maternal -  bonding  Description: INTERVENTIONS:- Assess caregiver- interactions.- Assess caregiver's emotional status and coping mechanisms.- Encourage caregiver to participate in  daily care.- Assess support systems available to mother/family.- Provide /case management support as needed.  Outcome: Adequate for Discharge      Discharge instructions reviewed with pt. Pt verbalized understanding regarding postpartum home instructions, importance of follow up appointments, s/s of PPH and Pre-E to continue to monitor for at home. IV was previously removed. Pt wheeled to main lobby with infant and driven home by family member.

## 2025-05-16 NOTE — LACTATION NOTE
This note was copied from a baby's chart.     05/15/25 2167   Evaluation Type   Evaluation Type Inpatient   Problems & Assessment   Problems Diagnosed or Identified Tongue restriction;Latch difficulty   Problems: comment/detail LGA, latch difficulty on left side. Tight lingual frenulum noted.   Infant Assessment Oral mucous membranes moist;Hunger cues present   Muscle tone Appropriate for GA   Feeding Assessment   Summary Current Feeding Breastfeeding with breast milk supplement;Breastfeeding with formula supplement   Breastfeeding Assessment Assisted with breastfeeding w/mother's permission;Deep latch achieved and observed;Calm and ready to breastfeed;Tolerated feeding well;Coordinated suck/swallow;Sustained nutrititive latch w/audible swallows   Latch 1   Audible Sucks/Swallows 2   Type of Nipple 2   Comfort (Breast/Nipple) 1   Hold (Positioning) 1   LATCH Score 7   Other (comment) Baby able to latch and maintain with minor adjustments to her position and body alignment. Good jaw movements.

## 2025-05-29 ENCOUNTER — POSTPARTUM (OUTPATIENT)
Dept: OBGYN CLINIC | Facility: CLINIC | Age: 35
End: 2025-05-29
Payer: MEDICAID

## 2025-05-29 VITALS
HEIGHT: 67 IN | WEIGHT: 215.63 LBS | SYSTOLIC BLOOD PRESSURE: 118 MMHG | BODY MASS INDEX: 33.84 KG/M2 | DIASTOLIC BLOOD PRESSURE: 72 MMHG

## 2025-05-29 DIAGNOSIS — Z09 POSTOPERATIVE EXAMINATION: Primary | ICD-10-CM

## 2025-05-29 RX ORDER — CEPHALEXIN 500 MG/1
500 CAPSULE ORAL 2 TIMES DAILY
Qty: 14 CAPSULE | Refills: 0 | Status: SHIPPED | OUTPATIENT
Start: 2025-05-29 | End: 2025-06-05

## 2025-05-29 NOTE — PROGRESS NOTES
OB Postpartum Wound Check    Chief Complaint   Patient presents with    Postpartum Care      2025         S: 34 year old   here for wound check, s/p  section on 25 for scheduled repeat  Patient with uncomplicated postpartum course.  Denies fevers, chills, nausea, vomiting, constipation, bladder sx.  Lochia slowed.  Breast/bottle 6 feeding.  Pain controlled.    Physical Exam  Vitals:    25 1553   BP: 118/72      Gen - alert and oriented, comfortable  Abd - soft, non-tender, non-distended.  Incision - Phannensteil, clean, right incision superficially  for 3 cm, no erythema, no discharge, nontender  Ext - no c/c/e  Skin - warm, dry no rash.    Assessment and Plan    ICD-10-CM    1. Postoperative examination  Z09          Keflex bid x 7 days given  Follow up 1 weeks for repeat incision check

## 2025-06-04 ENCOUNTER — TELEPHONE (OUTPATIENT)
Dept: OBGYN UNIT | Facility: HOSPITAL | Age: 35
End: 2025-06-04

## 2025-06-04 NOTE — LACTATION NOTE
Reviewed self and infant care with mom, she verbalizes understanding of instruction reviewed. Encouraged to follow up with MD's as directed with questions/concerns.  Patient states her incision opens and has an appointment tomorrow at the [facility]. This RN encouraged patient to put some gauze on her incision to prevent clothing from rubbing and to protect it from any possible drainage before her appointment tomorrow.

## 2025-06-04 NOTE — PROGRESS NOTES
OB Postpartum Wound Check    Chief Complaint   Patient presents with    Postpartum Care         S: 34 year old   here for wound check, s/p  section on 25 for scheduled repeat  Patient with uncomplicated postpartum course.  Denies fevers, chills, nausea, vomiting, constipation, bladder sx.  Lochia slowed.  Breast/bottle 6 feeding.  Pain controlled.    Physical Exam  Vitals:    25 1143   BP: 108/70        Gen - alert and oriented, comfortable  Abd - soft, non-tender, non-distended.  Incision - Phannensteil, clean, right incision superficially still  for 3 cm, no erythema, no discharge, nontender  Cleaned with H2O2 and Dermabond used to approximate the edges. Steristrips placed   Ext - no c/c/e  Skin - warm, dry no rash.    Assessment and Plan    ICD-10-CM    1. Encounter for post surgical wound check  Z48.89             Follow up postpartum visit as scheduled

## 2025-06-05 ENCOUNTER — POSTPARTUM (OUTPATIENT)
Dept: OBGYN CLINIC | Facility: CLINIC | Age: 35
End: 2025-06-05
Payer: MEDICAID

## 2025-06-05 VITALS — DIASTOLIC BLOOD PRESSURE: 70 MMHG | WEIGHT: 216.5 LBS | BODY MASS INDEX: 34 KG/M2 | SYSTOLIC BLOOD PRESSURE: 108 MMHG

## 2025-06-05 DIAGNOSIS — Z48.89 ENCOUNTER FOR POST SURGICAL WOUND CHECK: Primary | ICD-10-CM

## 2025-06-12 ENCOUNTER — TELEPHONE (OUTPATIENT)
Dept: OBGYN CLINIC | Facility: CLINIC | Age: 35
End: 2025-06-12

## 2025-06-12 NOTE — TELEPHONE ENCOUNTER
Transferred pt call. Pt reported incision site is painful rating 3-4/10, bleeding (noticed 4-5 drops), discharge that is yellow. Pt declined to come to OP office. Advised to seek further evaluation at her nearest immediate care. Pt declined and wanted to be scheduled with a provider at [facility] on Monday. Appt made for 6/16 with Dr. Pollack. Pt aware MD is on call. Advised to seek immediate evaluation if symptoms persist and/or come worse before time of schedule appt. Pt verbalized understanding.

## 2025-06-16 ENCOUNTER — POSTPARTUM (OUTPATIENT)
Dept: OBGYN CLINIC | Facility: CLINIC | Age: 35
End: 2025-06-16
Payer: MEDICAID

## 2025-06-16 VITALS
HEIGHT: 67 IN | DIASTOLIC BLOOD PRESSURE: 72 MMHG | SYSTOLIC BLOOD PRESSURE: 116 MMHG | BODY MASS INDEX: 33.27 KG/M2 | WEIGHT: 212 LBS

## 2025-06-16 DIAGNOSIS — Z48.89 ENCOUNTER FOR POST SURGICAL WOUND CHECK: Primary | ICD-10-CM

## 2025-06-16 NOTE — PROGRESS NOTES
CC: Patient is here for wound check. 2025 repeat c/s    HPI: Patient is a 34 year old  for above.     + c/o superficial separation R lateral wound with yellow/green discharge. No fever, chills.         Patient's last menstrual period was 2024 (exact date).    OB History    Para Term  AB Living   3 3 3 0 0 3   SAB IAB Ectopic Multiple Live Births   0 0 0 0 3      # Outcome Date GA Lbr Terrell/2nd Weight Sex Type Anes PTL Lv   3 Term 25 39w2d  9 lb 11.2 oz (4.4 kg) F Caesarean Spinal N GHISLAINE   2 Term 23 40w0d 09:09 / 00:54 8 lb 3.2 oz (3.72 kg) F VAGINAL SHELLEY EPI N GHISLAINE   1 Term 10/30/20 41w1d  9 lb 8.7 oz (4.33 kg) M CS-LTranv EPI  GHISLAINE      Complications: Failure to Progress in Second Stage       GYN hx:       LPS:      Past Medical History[1]  Past Surgical History[2]  Allergies[3]  Family History[4]  Social History     Socioeconomic History    Marital status:      Spouse name: Not on file    Number of children: 1    Years of education: 18    Highest education level: Master's degree (e.g., MA, MS, [place], MEd, MSW, DANGELO)   Occupational History    Not on file   Tobacco Use    Smoking status: Never     Passive exposure: Never    Smokeless tobacco: Never   Vaping Use    Vaping status: Never Used   Substance and Sexual Activity    Alcohol use: Never    Drug use: Never    Sexual activity: Yes     Partners: Male   Other Topics Concern     Service Not Asked    Blood Transfusions Yes    Caffeine Concern Yes    Occupational Exposure Not Asked    Hobby Hazards Not Asked    Sleep Concern Not Asked    Stress Concern Not Asked    Weight Concern Not Asked    Special Diet No    Back Care Not Asked    Exercise No    Bike Helmet Not Asked    Seat Belt Yes    Self-Exams Not Asked   Social History Narrative    Not on file     Social Drivers of Health     Food Insecurity: No Food Insecurity (2025)    NCSS - Food Insecurity     Worried About Running Out of Food in the Last Year: No      Ran Out of Food in the Last Year: No   Transportation Needs: No Transportation Needs (2025)    NCSS - Transportation     Lack of Transportation: No   Stress: No Stress Concern Present (2025)    Stress     Feeling of Stress : No   Housing Stability: Not At Risk (2025)    NCSS - Housing/Utilities     Has Housing: Yes     Worried About Losing Housing: No     Unable to Get Utilities: No       Medications reviewed. See active list.     LMP 2024 (Exact Date)       Exam:   GENERAL: well developed, well nourished, in no apparent distress  ABDOMEN: Soft, non distended; non tender, no masses.  Wound cdi. + 2 cm superficial separation about 3 mm deep R lateral incision. No drainage, no erythema      A/P: Patient is 34 year old female     1. Encounter for post surgical wound check    DW pt wound separation is very superficial and will close on it's own. Recommend FU with Dr. Callejas 2 weeks.   Iris Walters MD                [1]   Past Medical History:   Cold sore       [2]   Past Surgical History:  Procedure Laterality Date              Tonsillectomy      as a child   [3]   Allergies  Allergen Reactions    Pork Derived Products OTHER (SEE COMMENTS)     Orthodoxy   [4] No family history on file.

## (undated) DEVICE — ABSORBABLE HEMOSTAT (OXIDIZED REGENERATED CELLULOSE): Brand: SURGICEL NU-KNIT

## (undated) DEVICE — KIWI® VACUUM DELIVERY SYSTEM, OMNICUP®: Brand: KIWI® OMNICUP®

## (undated) NOTE — LETTER
04/12/25    To Whom It May Concern,     Melia Mendoza is under our care. She will be having surgery on 5/11/25 or 5/13/25. Should she desire to postpone her appointment on 5/19/25 she should be allowed to do so to allow for adequate recovery after her surgery.     Sincerely,          Dr. Jersey Rey MD    [facility]     This note was created by [employer] voice recognition. Errors in content may be related to improper recognition by the system; efforts to review and correct have been done but errors may still exist. Please be advised the primary purpose of this note is for me to communicate medical care. Standard sentence structure is not always used. Medical terminology and medical abbreviations may be used. There may be grammatical, typographical, and automated fill ins that may have errors missed in proofreading.

## (undated) NOTE — LETTER
VACCINE ADMINISTRATION RECORD  PARENT / GUARDIAN APPROVAL  Date: 3/17/2025  Vaccine administered to: Melia Mendoza     : 12/3/1990    MRN: [id number]    A copy of the appropriate Centers for Disease Control and Prevention Vaccine Information statement has been provided. I have read or have had explained the information about the diseases and the vaccines listed below. There was an opportunity to ask questions and any questions were answered satisfactorily. I believe that I understand the benefits and risks of the vaccine cited and ask that the vaccine(s) listed below be given to me or to the person named above (for whom I am authorized to make this request).    VACCINES ADMINISTERED:  Tdap    I have read and hereby agree to be bound by the terms of this agreement as stated above. My signature is valid until revoked by me in writing.  This document is signed by self, relationship: Self on 3/17/2025.:                                                                                                                                         Parent / Guardian Signature                                                Date

## (undated) NOTE — LETTER
VACCINE ADMINISTRATION RECORD  PARENT / GUARDIAN APPROVAL  Date: 2023  Vaccine administered to: Joel Scott     : 12/3/1990    MRN: [id number]    A copy of the appropriate Centers for Disease Control and Prevention Vaccine Information statement has been provided. I have read or have had explained the information about the diseases and the vaccines listed below. There was an opportunity to ask questions and any questions were answered satisfactorily. I believe that I understand the benefits and risks of the vaccine cited and ask that the vaccine(s) listed below be given to me or to the person named above (for whom I am authorized to make this request). VACCINES ADMINISTERED:  Menveo and Tdap    I have read and hereby agree to be bound by the terms of this agreement as stated above. My signature is valid until revoked by me in writing. This document is signed by Joaquín Velasquez relationship: Self on 2023.                                                                                                                                          Parent / Priyank Dsouza Signature                                                Date

## (undated) NOTE — LETTER
[facility]  Administration of Anesthesia  IMelia agree to be cared for by a physician anesthesiologist alone and/or with a nurse anesthetist, who is specially trained to monitor me and give me medicine to put me to sleep or keep me comfortable during my procedure    I understand that my anesthesiologist and/or anesthetist is not an employee or agent of [facility] or [employer]. He or she works for [facility]    As the patient asking for anesthesia services, I agree to:  Allow the anesthesiologist (anesthesia doctor) to give me medicine and do additional procedures as necessary. Some examples are: Starting or using an “IV” to give me medicine, fluids or blood during my procedure, and having a breathing tube placed to help me breathe when I’m asleep (intubation). In the event that my heart stops working properly, I understand that my anesthesiologist will make every effort to sustain my life, unless otherwise directed by [facility] Do Not Resuscitate documents.  Tell my anesthesia doctor before my procedure:  If I am pregnant.  The last time that I ate or drank.  iii. All of the medicines I take (including prescriptions, herbal supplements, and pills I can buy without a prescription (including street drugs/illegal medications). Failure to inform my anesthesiologist about these medicines may increase my risk of anesthetic complications.  iv.If I am allergic to anything or have had a reaction to anesthesia before.  I understand how the anesthesia medicine will help me (benefits).  I understand that with any type of anesthesia medicine there are risks:  The most common risks are: nausea, vomiting, sore throat, muscle soreness, damage to my eyes, mouth, or teeth (from breathing tube placement).  Rare risks include: remembering what happened during my procedure, allergic reactions to medications, injury to my airway, heart, lungs, vision, nerves, or muscles  and in extremely rare instances death.  My doctor has explained to me other choices available to me for my care (alternatives).  Pregnant Patients (“epidural”):  I understand that the risks of having an epidural (medicine given into my back to help control pain during labor), include itching, low blood pressure, difficulty urinating, headache or slowing of the baby’s heart. Very rare risks include infection, bleeding, seizure, irregular heart rhythms and nerve injury.  Regional Anesthesia (“spinal”, “epidural”, & “nerve blocks”):  I understand that rare but potential complications include headache, bleeding, infection, seizure, irregular heart rhythms, and nerve injury.    _____________________________________________________________________________  Patient (or Representative) Signature/Relationship to Patient  Date   Time    _____________________________________________________________________________   Name (if used)    Language/Organization   Time    _____________________________________________________________________________  Nurse Anesthetist Signature     Date   Time  _____________________________________________________________________________  Anesthesiologist Signature     Date   Time  I have discussed the procedure and information above with the patient (or patient’s representative) and answered their questions. The patient or their representative has agreed to have anesthesia services.    _____________________________________________________________________________  Witness        Date   Time  I have verified that the signature is that of the patient or patient’s representative, and that it was signed before the procedure  Patient Name: Melia Mendoza     : 12/3/1990                 Printed: 2025 at 5:38 AM    Medical Record #: [id number]                                            Page 1 of 1  ----------ANESTHESIA CONSENT----------